# Patient Record
Sex: MALE | Race: ASIAN | NOT HISPANIC OR LATINO
[De-identification: names, ages, dates, MRNs, and addresses within clinical notes are randomized per-mention and may not be internally consistent; named-entity substitution may affect disease eponyms.]

---

## 2017-11-06 PROBLEM — Z00.00 ENCOUNTER FOR PREVENTIVE HEALTH EXAMINATION: Status: ACTIVE | Noted: 2017-11-06

## 2017-11-10 ENCOUNTER — NON-APPOINTMENT (OUTPATIENT)
Age: 77
End: 2017-11-10

## 2017-11-10 ENCOUNTER — APPOINTMENT (OUTPATIENT)
Dept: CARDIOLOGY | Facility: CLINIC | Age: 77
End: 2017-11-10
Payer: MEDICAID

## 2017-11-10 VITALS
SYSTOLIC BLOOD PRESSURE: 134 MMHG | WEIGHT: 155 LBS | HEIGHT: 70 IN | HEART RATE: 66 BPM | BODY MASS INDEX: 22.19 KG/M2 | OXYGEN SATURATION: 96 % | DIASTOLIC BLOOD PRESSURE: 75 MMHG

## 2017-11-10 DIAGNOSIS — Z78.9 OTHER SPECIFIED HEALTH STATUS: ICD-10-CM

## 2017-11-10 PROCEDURE — 93000 ELECTROCARDIOGRAM COMPLETE: CPT

## 2017-11-10 PROCEDURE — 99205 OFFICE O/P NEW HI 60 MIN: CPT

## 2017-12-11 DIAGNOSIS — E11.9 TYPE 2 DIABETES MELLITUS W/OUT COMPLICATIONS: ICD-10-CM

## 2018-01-03 ENCOUNTER — APPOINTMENT (OUTPATIENT)
Dept: CARDIOLOGY | Facility: CLINIC | Age: 78
End: 2018-01-03
Payer: MEDICAID

## 2018-01-03 PROCEDURE — 93306 TTE W/DOPPLER COMPLETE: CPT

## 2018-01-05 ENCOUNTER — APPOINTMENT (OUTPATIENT)
Dept: CARDIOLOGY | Facility: CLINIC | Age: 78
End: 2018-01-05

## 2018-01-26 ENCOUNTER — APPOINTMENT (OUTPATIENT)
Dept: CARDIOLOGY | Facility: CLINIC | Age: 78
End: 2018-01-26
Payer: MEDICAID

## 2018-01-26 PROCEDURE — 93015 CV STRESS TEST SUPVJ I&R: CPT

## 2018-01-26 PROCEDURE — A9500: CPT

## 2018-01-26 PROCEDURE — 78452 HT MUSCLE IMAGE SPECT MULT: CPT

## 2018-02-13 DIAGNOSIS — R94.39 ABNORMAL RESULT OF OTHER CARDIOVASCULAR FUNCTION STUDY: ICD-10-CM

## 2018-03-13 LAB
ANION GAP SERPL CALC-SCNC: 11 MMOL/L
BUN SERPL-MCNC: 20 MG/DL
CALCIUM SERPL-MCNC: 9.5 MG/DL
CHLORIDE SERPL-SCNC: 101 MMOL/L
CO2 SERPL-SCNC: 27 MMOL/L
CREAT SERPL-MCNC: 0.96 MG/DL
GLUCOSE SERPL-MCNC: 155 MG/DL
POTASSIUM SERPL-SCNC: 5 MMOL/L
SODIUM SERPL-SCNC: 139 MMOL/L

## 2018-03-15 ENCOUNTER — FORM ENCOUNTER (OUTPATIENT)
Age: 78
End: 2018-03-15

## 2018-03-16 ENCOUNTER — APPOINTMENT (OUTPATIENT)
Dept: CARDIOLOGY | Facility: CLINIC | Age: 78
End: 2018-03-16

## 2018-03-16 ENCOUNTER — OUTPATIENT (OUTPATIENT)
Dept: OUTPATIENT SERVICES | Facility: HOSPITAL | Age: 78
LOS: 1 days | End: 2018-03-16
Payer: MEDICAID

## 2018-03-16 DIAGNOSIS — R94.39 ABNORMAL RESULT OF OTHER CARDIOVASCULAR FUNCTION STUDY: ICD-10-CM

## 2018-03-16 PROCEDURE — 75574 CT ANGIO HRT W/3D IMAGE: CPT | Mod: 26,76

## 2018-03-16 PROCEDURE — 75574 CT ANGIO HRT W/3D IMAGE: CPT

## 2018-03-20 ENCOUNTER — CHART COPY (OUTPATIENT)
Age: 78
End: 2018-03-20

## 2018-03-30 ENCOUNTER — OUTPATIENT (OUTPATIENT)
Dept: OUTPATIENT SERVICES | Facility: HOSPITAL | Age: 78
LOS: 1 days | End: 2018-03-30
Payer: MEDICAID

## 2018-03-30 ENCOUNTER — APPOINTMENT (OUTPATIENT)
Dept: CARDIOTHORACIC SURGERY | Facility: CLINIC | Age: 78
End: 2018-03-30
Payer: MEDICAID

## 2018-03-30 VITALS
SYSTOLIC BLOOD PRESSURE: 165 MMHG | WEIGHT: 145.06 LBS | OXYGEN SATURATION: 99 % | HEART RATE: 59 BPM | RESPIRATION RATE: 16 BRPM | DIASTOLIC BLOOD PRESSURE: 75 MMHG | TEMPERATURE: 98 F | HEIGHT: 68 IN

## 2018-03-30 DIAGNOSIS — R93.1 ABNORMAL FINDINGS ON DIAGNOSTIC IMAGING OF HEART AND CORONARY CIRCULATION: ICD-10-CM

## 2018-03-30 LAB
ALBUMIN SERPL ELPH-MCNC: 4.5 G/DL — SIGNIFICANT CHANGE UP (ref 3.3–5)
ALP SERPL-CCNC: 37 U/L — LOW (ref 40–120)
ALT FLD-CCNC: 18 U/L RC — SIGNIFICANT CHANGE UP (ref 10–45)
ANION GAP SERPL CALC-SCNC: 7 MMOL/L — SIGNIFICANT CHANGE UP (ref 5–17)
AST SERPL-CCNC: 22 U/L — SIGNIFICANT CHANGE UP (ref 10–40)
BILIRUB SERPL-MCNC: 0.8 MG/DL — SIGNIFICANT CHANGE UP (ref 0.2–1.2)
BUN SERPL-MCNC: 21 MG/DL — SIGNIFICANT CHANGE UP (ref 7–23)
CALCIUM SERPL-MCNC: 9.8 MG/DL — SIGNIFICANT CHANGE UP (ref 8.4–10.5)
CHLORIDE SERPL-SCNC: 102 MMOL/L — SIGNIFICANT CHANGE UP (ref 96–108)
CO2 SERPL-SCNC: 29 MMOL/L — SIGNIFICANT CHANGE UP (ref 22–31)
CREAT SERPL-MCNC: 1.14 MG/DL — SIGNIFICANT CHANGE UP (ref 0.5–1.3)
GLUCOSE SERPL-MCNC: 156 MG/DL — HIGH (ref 70–99)
HCT VFR BLD CALC: 39.9 % — SIGNIFICANT CHANGE UP (ref 39–50)
HGB BLD-MCNC: 14 G/DL — SIGNIFICANT CHANGE UP (ref 13–17)
MCHC RBC-ENTMCNC: 33.5 PG — SIGNIFICANT CHANGE UP (ref 27–34)
MCHC RBC-ENTMCNC: 35 GM/DL — SIGNIFICANT CHANGE UP (ref 32–36)
MCV RBC AUTO: 95.7 FL — SIGNIFICANT CHANGE UP (ref 80–100)
PLATELET # BLD AUTO: 116 K/UL — LOW (ref 150–400)
POTASSIUM SERPL-MCNC: 4.8 MMOL/L — SIGNIFICANT CHANGE UP (ref 3.5–5.3)
POTASSIUM SERPL-SCNC: 4.8 MMOL/L — SIGNIFICANT CHANGE UP (ref 3.5–5.3)
PROT SERPL-MCNC: 8 G/DL — SIGNIFICANT CHANGE UP (ref 6–8.3)
RBC # BLD: 4.17 M/UL — LOW (ref 4.2–5.8)
RBC # FLD: 11.3 % — SIGNIFICANT CHANGE UP (ref 10.3–14.5)
SODIUM SERPL-SCNC: 138 MMOL/L — SIGNIFICANT CHANGE UP (ref 135–145)
WBC # BLD: 5.6 K/UL — SIGNIFICANT CHANGE UP (ref 3.8–10.5)
WBC # FLD AUTO: 5.6 K/UL — SIGNIFICANT CHANGE UP (ref 3.8–10.5)

## 2018-03-30 PROCEDURE — 93010 ELECTROCARDIOGRAM REPORT: CPT

## 2018-03-30 PROCEDURE — 94010 BREATHING CAPACITY TEST: CPT

## 2018-03-30 RX ORDER — SODIUM CHLORIDE 9 MG/ML
1000 INJECTION, SOLUTION INTRAVENOUS
Qty: 0 | Refills: 0 | Status: DISCONTINUED | OUTPATIENT
Start: 2018-03-30 | End: 2018-03-30

## 2018-03-30 RX ORDER — INSULIN LISPRO 100/ML
VIAL (ML) SUBCUTANEOUS
Qty: 0 | Refills: 0 | Status: DISCONTINUED | OUTPATIENT
Start: 2018-03-30 | End: 2018-03-30

## 2018-03-30 RX ORDER — GLUCAGON INJECTION, SOLUTION 0.5 MG/.1ML
1 INJECTION, SOLUTION SUBCUTANEOUS ONCE
Qty: 0 | Refills: 0 | Status: DISCONTINUED | OUTPATIENT
Start: 2018-03-30 | End: 2018-03-30

## 2018-03-30 RX ORDER — INSULIN LISPRO 100/ML
VIAL (ML) SUBCUTANEOUS AT BEDTIME
Qty: 0 | Refills: 0 | Status: DISCONTINUED | OUTPATIENT
Start: 2018-03-30 | End: 2018-03-30

## 2018-03-30 RX ORDER — SIMVASTATIN 20 MG/1
20 TABLET, FILM COATED ORAL AT BEDTIME
Qty: 0 | Refills: 0 | Status: DISCONTINUED | OUTPATIENT
Start: 2018-03-30 | End: 2018-03-30

## 2018-03-30 RX ORDER — DEXTROSE 50 % IN WATER 50 %
25 SYRINGE (ML) INTRAVENOUS ONCE
Qty: 0 | Refills: 0 | Status: DISCONTINUED | OUTPATIENT
Start: 2018-03-30 | End: 2018-03-30

## 2018-03-30 RX ORDER — DEXTROSE 50 % IN WATER 50 %
1 SYRINGE (ML) INTRAVENOUS ONCE
Qty: 0 | Refills: 0 | Status: DISCONTINUED | OUTPATIENT
Start: 2018-03-30 | End: 2018-03-30

## 2018-03-30 RX ORDER — ASPIRIN/CALCIUM CARB/MAGNESIUM 324 MG
81 TABLET ORAL DAILY
Qty: 0 | Refills: 0 | Status: DISCONTINUED | OUTPATIENT
Start: 2018-03-30 | End: 2018-03-30

## 2018-03-30 RX ORDER — DEXTROSE 50 % IN WATER 50 %
12.5 SYRINGE (ML) INTRAVENOUS ONCE
Qty: 0 | Refills: 0 | Status: DISCONTINUED | OUTPATIENT
Start: 2018-03-30 | End: 2018-03-30

## 2018-03-30 NOTE — H&P CARDIOLOGY - HISTORY OF PRESENT ILLNESS
This is a 77 yo Chinese male, speaks Mandarin only ( data obtained from Md note, daughter and  # ) with PMH of HLD and DM type 2 ( well managed, w/o complications, last A1C unknown, dx 20 years ago), denies any significant CV PMH or FH.  Presents to cardiologist, dr MARLA Alfred, w/o c/c for routine visit.  He has abnormal CT coronaries showing calcium score of 1078 and Significant stenosis of the distal right coronary artery (greater  than 70%)- see report below.       < from: CT Heart with Coronaries (03.16.18 @ 15:47) >    IMPRESSION:    1.  Coronary artery disease.  2.  The calculated Agatston score is 1078.  3.  Significant stenosis of the distal right coronary artery (greater   than 70%).  4.  Significant stenosis (greater than 70%) within the distal left   anterior descending coronary artery.    5.  Significant stenosis (greater than 70%) of the mid to distal   circumflex coronary artery and the third obtuse marginal division.    < end of copied text > This is a 79 yo Chinese male, speaks Mandarin only ( data obtained from Md note, daughter and  # ) with PMH of HLD and DM type 2 ( well managed, w/o complications, last A1C unknown, dx 20 years ago), denies any significant CV PMH or FH.  Presents to cardiologist, dr MARLA Alfred, w/o c/c for routine visit.  He has abnormal CT coronaries showing calcium score of 1078 and Significant stenosis of the distal right coronary artery (greater  than 70%)- see report below.  NST on 1/26 showed: medium sized, mod to severe defect in inferior distal, anterior, and apical walls that are mostly fixed, and only partially correct with prone imaging.  EF 52%. Findings most consisted with minimal paulina-infarct ischemia. Referred here today for cardiac cath w/ possible intervention.  Presently asymptomatic, denies chest pain, dyspnea, dizziness, palpitations, orthopnea, N&V, HA, abd pain, fever/chills.        < from: CT Heart with Coronaries (03.16.18 @ 15:47) >    IMPRESSION:    1.  Coronary artery disease.  2.  The calculated Agatston score is 1078.  3.  Significant stenosis of the distal right coronary artery (greater   than 70%).  4.  Significant stenosis (greater than 70%) within the distal left   anterior descending coronary artery.    5.  Significant stenosis (greater than 70%) of the mid to distal   circumflex coronary artery and the third obtuse marginal division.    < end of copied text > This is a 77 yo Chinese male, speaks Mandarin only ( data obtained from Md note, daughter and Mandarin  # [id number]) with PMH of HLD and DM type 2 ( well managed, w/o complications, last A1C unknown, dx 20 years ago), denies any significant CV PMH or FH.  Presents to cardiologist, dr MARLA Alfred, w/o any c/c for routine visit.  He had abnormal CT coronaries showing calcium score of 1078 and Significant stenosis of the distal right coronary artery (greater  than 70%)- see report below.  NST on 1/26 showed: medium sized, mod to severe defect in inferior distal, anterior, and apical walls that are mostly fixed, and only partially correct with prone imaging.  EF 52%. Findings most consisted with minimal paulina-infarct ischemia. Referred here today for cardiac cath w/ possible intervention.  Presently asymptomatic, denies chest pain, dyspnea, dizziness, palpitations, orthopnea, N&V, HA, abd pain, fever/chills.        < from: CT Heart with Coronaries (03.16.18 @ 15:47) >    IMPRESSION:    1.  Coronary artery disease.  2.  The calculated Agatston score is 1078.  3.  Significant stenosis of the distal right coronary artery (greater   than 70%).  4.  Significant stenosis (greater than 70%) within the distal left   anterior descending coronary artery.    5.  Significant stenosis (greater than 70%) of the mid to distal   circumflex coronary artery and the third obtuse marginal division.    < end of copied text >

## 2018-04-02 ENCOUNTER — OUTPATIENT (OUTPATIENT)
Dept: OUTPATIENT SERVICES | Facility: HOSPITAL | Age: 78
LOS: 1 days | End: 2018-04-02

## 2018-04-02 VITALS
TEMPERATURE: 98 F | SYSTOLIC BLOOD PRESSURE: 149 MMHG | RESPIRATION RATE: 18 BRPM | DIASTOLIC BLOOD PRESSURE: 77 MMHG | HEIGHT: 66.5 IN | OXYGEN SATURATION: 98 % | HEART RATE: 57 BPM | WEIGHT: 153 LBS

## 2018-04-02 DIAGNOSIS — I25.10 ATHEROSCLEROTIC HEART DISEASE OF NATIVE CORONARY ARTERY WITHOUT ANGINA PECTORIS: ICD-10-CM

## 2018-04-02 DIAGNOSIS — Z01.818 ENCOUNTER FOR OTHER PREPROCEDURAL EXAMINATION: ICD-10-CM

## 2018-04-02 DIAGNOSIS — Z96.7 PRESENCE OF OTHER BONE AND TENDON IMPLANTS: Chronic | ICD-10-CM

## 2018-04-02 DIAGNOSIS — E11.9 TYPE 2 DIABETES MELLITUS WITHOUT COMPLICATIONS: ICD-10-CM

## 2018-04-02 DIAGNOSIS — Z98.890 OTHER SPECIFIED POSTPROCEDURAL STATES: Chronic | ICD-10-CM

## 2018-04-02 LAB
BLD GP AB SCN SERPL QL: NEGATIVE — SIGNIFICANT CHANGE UP
HBA1C BLD-MCNC: 7.5 % — HIGH (ref 4–5.6)
HCT VFR BLD CALC: 37.2 % — LOW (ref 39–50)
HGB BLD-MCNC: 13.1 G/DL — SIGNIFICANT CHANGE UP (ref 13–17)
MCHC RBC-ENTMCNC: 32.6 PG — SIGNIFICANT CHANGE UP (ref 27–34)
MCHC RBC-ENTMCNC: 35.2 GM/DL — SIGNIFICANT CHANGE UP (ref 32–36)
MCV RBC AUTO: 92.5 FL — SIGNIFICANT CHANGE UP (ref 80–100)
MRSA PCR RESULT.: SIGNIFICANT CHANGE UP
NRBC # BLD: 0 /100 WBCS — SIGNIFICANT CHANGE UP (ref 0–0)
PLATELET # BLD AUTO: 125 K/UL — LOW (ref 150–400)
RBC # BLD: 4.02 M/UL — LOW (ref 4.2–5.8)
RBC # FLD: 12.4 % — SIGNIFICANT CHANGE UP (ref 10.3–14.5)
RH IG SCN BLD-IMP: POSITIVE — SIGNIFICANT CHANGE UP
S AUREUS DNA NOSE QL NAA+PROBE: SIGNIFICANT CHANGE UP
WBC # BLD: 6.5 K/UL — SIGNIFICANT CHANGE UP (ref 3.8–10.5)
WBC # FLD AUTO: 6.5 K/UL — SIGNIFICANT CHANGE UP (ref 3.8–10.5)

## 2018-04-02 NOTE — H&P PST ADULT - HISTORY OF PRESENT ILLNESS
79 y/o M PMH CAD, had an abnormal echo, stress test was abnormal as per daughter, found to have TVD on cardiac catheterization 77 y/o mandarin speaking M PMH CAD, some dyspnea on exertion noted, denies angina, had an abnormal echo, stress test as per daughter, found to have TVD on cardiac catheterization 3/2018.  Presents today for CABG X 3.

## 2018-04-02 NOTE — H&P PST ADULT - NSANTHOSAYNRD_GEN_A_CORE
No. VANE screening performed.  STOP BANG Legend: 0-2 = LOW Risk; 3-4 = INTERMEDIATE Risk; 5-8 = HIGH Risk

## 2018-04-02 NOTE — H&P PST ADULT - MEDICATION ADMINISTRATION INFO, PROFILE
translation services while in hospital, daughter will not be able to come in a few days after surgery

## 2018-04-04 ENCOUNTER — TRANSCRIPTION ENCOUNTER (OUTPATIENT)
Age: 78
End: 2018-04-04

## 2018-04-05 ENCOUNTER — INPATIENT (INPATIENT)
Facility: HOSPITAL | Age: 78
LOS: 4 days | Discharge: HOME | DRG: 236 | End: 2018-04-10
Payer: MEDICAID

## 2018-04-05 ENCOUNTER — APPOINTMENT (OUTPATIENT)
Dept: CARDIOTHORACIC SURGERY | Facility: HOSPITAL | Age: 78
End: 2018-04-05
Payer: MEDICAID

## 2018-04-05 VITALS
DIASTOLIC BLOOD PRESSURE: 75 MMHG | RESPIRATION RATE: 18 BRPM | SYSTOLIC BLOOD PRESSURE: 170 MMHG | HEART RATE: 60 BPM | OXYGEN SATURATION: 100 % | HEIGHT: 68.11 IN | WEIGHT: 151.24 LBS | TEMPERATURE: 98 F

## 2018-04-05 DIAGNOSIS — Z01.818 ENCOUNTER FOR OTHER PREPROCEDURAL EXAMINATION: ICD-10-CM

## 2018-04-05 DIAGNOSIS — Z96.7 PRESENCE OF OTHER BONE AND TENDON IMPLANTS: Chronic | ICD-10-CM

## 2018-04-05 DIAGNOSIS — Z98.890 OTHER SPECIFIED POSTPROCEDURAL STATES: Chronic | ICD-10-CM

## 2018-04-05 DIAGNOSIS — I25.10 ATHEROSCLEROTIC HEART DISEASE OF NATIVE CORONARY ARTERY WITHOUT ANGINA PECTORIS: ICD-10-CM

## 2018-04-05 LAB
ANION GAP SERPL CALC-SCNC: 14 MMOL/L — SIGNIFICANT CHANGE UP (ref 5–17)
APTT BLD: 40.8 SEC — HIGH (ref 27.5–37.4)
BASOPHILS # BLD AUTO: 0 K/UL — SIGNIFICANT CHANGE UP (ref 0–0.2)
BASOPHILS NFR BLD AUTO: 0.3 % — SIGNIFICANT CHANGE UP (ref 0–2)
BUN SERPL-MCNC: 12 MG/DL — SIGNIFICANT CHANGE UP (ref 7–23)
CALCIUM SERPL-MCNC: 10.2 MG/DL — SIGNIFICANT CHANGE UP (ref 8.4–10.5)
CHLORIDE SERPL-SCNC: 106 MMOL/L — SIGNIFICANT CHANGE UP (ref 96–108)
CK MB BLD-MCNC: 8.1 % — HIGH (ref 0–3.5)
CK MB CFR SERPL CALC: 24.1 NG/ML — HIGH (ref 0–6.7)
CK SERPL-CCNC: 297 U/L — HIGH (ref 30–200)
CO2 SERPL-SCNC: 23 MMOL/L — SIGNIFICANT CHANGE UP (ref 22–31)
CREAT SERPL-MCNC: 0.84 MG/DL — SIGNIFICANT CHANGE UP (ref 0.5–1.3)
EOSINOPHIL # BLD AUTO: 0 K/UL — SIGNIFICANT CHANGE UP (ref 0–0.5)
EOSINOPHIL NFR BLD AUTO: 0.4 % — SIGNIFICANT CHANGE UP (ref 0–6)
GAS PNL BLDA: SIGNIFICANT CHANGE UP
GLUCOSE BLDC GLUCOMTR-MCNC: 180 MG/DL — HIGH (ref 70–99)
GLUCOSE BLDC GLUCOMTR-MCNC: 232 MG/DL — HIGH (ref 70–99)
GLUCOSE SERPL-MCNC: 167 MG/DL — HIGH (ref 70–99)
HCT VFR BLD CALC: 26 % — LOW (ref 39–50)
HGB BLD-MCNC: 9.2 G/DL — LOW (ref 13–17)
INR BLD: 1.41 RATIO — HIGH (ref 0.88–1.16)
LYMPHOCYTES # BLD AUTO: 1.8 K/UL — SIGNIFICANT CHANGE UP (ref 1–3.3)
LYMPHOCYTES # BLD AUTO: 20.8 % — SIGNIFICANT CHANGE UP (ref 13–44)
MCHC RBC-ENTMCNC: 33.3 PG — SIGNIFICANT CHANGE UP (ref 27–34)
MCHC RBC-ENTMCNC: 35.2 GM/DL — SIGNIFICANT CHANGE UP (ref 32–36)
MCV RBC AUTO: 94.5 FL — SIGNIFICANT CHANGE UP (ref 80–100)
MONOCYTES # BLD AUTO: 0.5 K/UL — SIGNIFICANT CHANGE UP (ref 0–0.9)
MONOCYTES NFR BLD AUTO: 5.9 % — SIGNIFICANT CHANGE UP (ref 2–14)
NEUTROPHILS # BLD AUTO: 6.2 K/UL — SIGNIFICANT CHANGE UP (ref 1.8–7.4)
NEUTROPHILS NFR BLD AUTO: 72.6 % — SIGNIFICANT CHANGE UP (ref 43–77)
PLATELET # BLD AUTO: 94 K/UL — LOW (ref 150–400)
POTASSIUM SERPL-MCNC: 3.9 MMOL/L — SIGNIFICANT CHANGE UP (ref 3.5–5.3)
POTASSIUM SERPL-SCNC: 3.9 MMOL/L — SIGNIFICANT CHANGE UP (ref 3.5–5.3)
PROTHROM AB SERPL-ACNC: 15.3 SEC — HIGH (ref 9.8–12.7)
RBC # BLD: 2.75 M/UL — LOW (ref 4.2–5.8)
RBC # FLD: 11.4 % — SIGNIFICANT CHANGE UP (ref 10.3–14.5)
RH IG SCN BLD-IMP: POSITIVE — SIGNIFICANT CHANGE UP
SODIUM SERPL-SCNC: 143 MMOL/L — SIGNIFICANT CHANGE UP (ref 135–145)
TROPONIN T SERPL-MCNC: 0.59 NG/ML — HIGH (ref 0–0.06)
WBC # BLD: 8.6 K/UL — SIGNIFICANT CHANGE UP (ref 3.8–10.5)
WBC # FLD AUTO: 8.6 K/UL — SIGNIFICANT CHANGE UP (ref 3.8–10.5)

## 2018-04-05 PROCEDURE — 99152 MOD SED SAME PHYS/QHP 5/>YRS: CPT

## 2018-04-05 PROCEDURE — 71045 X-RAY EXAM CHEST 1 VIEW: CPT | Mod: 26

## 2018-04-05 PROCEDURE — 71045 X-RAY EXAM CHEST 1 VIEW: CPT

## 2018-04-05 PROCEDURE — 93005 ELECTROCARDIOGRAM TRACING: CPT

## 2018-04-05 PROCEDURE — 33518 CABG ARTERY-VEIN TWO: CPT | Mod: AS

## 2018-04-05 PROCEDURE — 93458 L HRT ARTERY/VENTRICLE ANGIO: CPT

## 2018-04-05 PROCEDURE — C1894: CPT

## 2018-04-05 PROCEDURE — 33508 ENDOSCOPIC VEIN HARVEST: CPT | Mod: AS

## 2018-04-05 PROCEDURE — 80053 COMPREHEN METABOLIC PANEL: CPT

## 2018-04-05 PROCEDURE — 33533 CABG ARTERIAL SINGLE: CPT

## 2018-04-05 PROCEDURE — 93880 EXTRACRANIAL BILAT STUDY: CPT

## 2018-04-05 PROCEDURE — 33533 CABG ARTERIAL SINGLE: CPT | Mod: AS

## 2018-04-05 PROCEDURE — 33508 ENDOSCOPIC VEIN HARVEST: CPT

## 2018-04-05 PROCEDURE — 33518 CABG ARTERY-VEIN TWO: CPT

## 2018-04-05 PROCEDURE — C1887: CPT

## 2018-04-05 PROCEDURE — 85027 COMPLETE CBC AUTOMATED: CPT

## 2018-04-05 PROCEDURE — 93452 LEFT HRT CATH W/VENTRCLGRPHY: CPT

## 2018-04-05 PROCEDURE — C1769: CPT

## 2018-04-05 PROCEDURE — 93010 ELECTROCARDIOGRAM REPORT: CPT

## 2018-04-05 RX ORDER — POTASSIUM CHLORIDE 20 MEQ
10 PACKET (EA) ORAL ONCE
Qty: 0 | Refills: 0 | Status: DISCONTINUED | OUTPATIENT
Start: 2018-04-05 | End: 2018-04-07

## 2018-04-05 RX ORDER — CEFUROXIME AXETIL 250 MG
1500 TABLET ORAL ONCE
Qty: 0 | Refills: 0 | Status: COMPLETED | OUTPATIENT
Start: 2018-04-05 | End: 2018-04-05

## 2018-04-05 RX ORDER — METOCLOPRAMIDE HCL 10 MG
10 TABLET ORAL EVERY 8 HOURS
Qty: 0 | Refills: 0 | Status: COMPLETED | OUTPATIENT
Start: 2018-04-05 | End: 2018-04-07

## 2018-04-05 RX ORDER — SODIUM CHLORIDE 9 MG/ML
3 INJECTION INTRAMUSCULAR; INTRAVENOUS; SUBCUTANEOUS EVERY 8 HOURS
Qty: 0 | Refills: 0 | Status: DISCONTINUED | OUTPATIENT
Start: 2018-04-05 | End: 2018-04-05

## 2018-04-05 RX ORDER — DEXMEDETOMIDINE HYDROCHLORIDE IN 0.9% SODIUM CHLORIDE 4 UG/ML
0.2 INJECTION INTRAVENOUS
Qty: 200 | Refills: 0 | Status: DISCONTINUED | OUTPATIENT
Start: 2018-04-05 | End: 2018-04-06

## 2018-04-05 RX ORDER — MAGNESIUM SULFATE 500 MG/ML
2 VIAL (ML) INJECTION ONCE
Qty: 0 | Refills: 0 | Status: COMPLETED | OUTPATIENT
Start: 2018-04-05 | End: 2018-04-05

## 2018-04-05 RX ORDER — POTASSIUM CHLORIDE 20 MEQ
10 PACKET (EA) ORAL
Qty: 0 | Refills: 0 | Status: DISCONTINUED | OUTPATIENT
Start: 2018-04-05 | End: 2018-04-07

## 2018-04-05 RX ORDER — SODIUM CHLORIDE 9 MG/ML
1000 INJECTION, SOLUTION INTRAVENOUS
Qty: 0 | Refills: 0 | Status: DISCONTINUED | OUTPATIENT
Start: 2018-04-05 | End: 2018-04-10

## 2018-04-05 RX ORDER — ASPIRIN/CALCIUM CARB/MAGNESIUM 324 MG
1 TABLET ORAL
Qty: 0 | Refills: 0 | COMMUNITY

## 2018-04-05 RX ORDER — DOCUSATE SODIUM 100 MG
100 CAPSULE ORAL THREE TIMES A DAY
Qty: 0 | Refills: 0 | Status: DISCONTINUED | OUTPATIENT
Start: 2018-04-05 | End: 2018-04-10

## 2018-04-05 RX ORDER — POTASSIUM CHLORIDE 20 MEQ
10 PACKET (EA) ORAL
Qty: 0 | Refills: 0 | Status: COMPLETED | OUTPATIENT
Start: 2018-04-05 | End: 2018-04-06

## 2018-04-05 RX ORDER — NOREPINEPHRINE BITARTRATE/D5W 8 MG/250ML
0.02 PLASTIC BAG, INJECTION (ML) INTRAVENOUS
Qty: 8 | Refills: 0 | Status: DISCONTINUED | OUTPATIENT
Start: 2018-04-05 | End: 2018-04-07

## 2018-04-05 RX ORDER — SIMVASTATIN 20 MG/1
1 TABLET, FILM COATED ORAL
Qty: 0 | Refills: 0 | COMMUNITY

## 2018-04-05 RX ORDER — LIDOCAINE HCL 20 MG/ML
0.2 VIAL (ML) INJECTION ONCE
Qty: 0 | Refills: 0 | Status: DISCONTINUED | OUTPATIENT
Start: 2018-04-05 | End: 2018-04-05

## 2018-04-05 RX ORDER — CEFUROXIME AXETIL 250 MG
1500 TABLET ORAL EVERY 8 HOURS
Qty: 0 | Refills: 0 | Status: COMPLETED | OUTPATIENT
Start: 2018-04-05 | End: 2018-04-07

## 2018-04-05 RX ORDER — FAMOTIDINE 10 MG/ML
20 INJECTION INTRAVENOUS EVERY 12 HOURS
Qty: 0 | Refills: 0 | Status: DISCONTINUED | OUTPATIENT
Start: 2018-04-05 | End: 2018-04-06

## 2018-04-05 RX ORDER — INSULIN HUMAN 100 [IU]/ML
3 INJECTION, SOLUTION SUBCUTANEOUS
Qty: 100 | Refills: 0 | Status: DISCONTINUED | OUTPATIENT
Start: 2018-04-05 | End: 2018-04-06

## 2018-04-05 RX ORDER — POTASSIUM CHLORIDE 20 MEQ
10 PACKET (EA) ORAL
Qty: 0 | Refills: 0 | Status: COMPLETED | OUTPATIENT
Start: 2018-04-05 | End: 2018-04-05

## 2018-04-05 RX ORDER — MEPERIDINE HYDROCHLORIDE 50 MG/ML
25 INJECTION INTRAMUSCULAR; INTRAVENOUS; SUBCUTANEOUS ONCE
Qty: 0 | Refills: 0 | Status: DISCONTINUED | OUTPATIENT
Start: 2018-04-05 | End: 2018-04-06

## 2018-04-05 RX ORDER — ALBUMIN HUMAN 25 %
250 VIAL (ML) INTRAVENOUS
Qty: 0 | Refills: 0 | Status: COMPLETED | OUTPATIENT
Start: 2018-04-05 | End: 2018-04-05

## 2018-04-05 RX ADMIN — SODIUM CHLORIDE 3 MILLILITER(S): 9 INJECTION INTRAMUSCULAR; INTRAVENOUS; SUBCUTANEOUS at 12:00

## 2018-04-05 RX ADMIN — Medication 100 MILLIEQUIVALENT(S): at 20:34

## 2018-04-05 RX ADMIN — Medication 10 MILLIGRAM(S): at 22:38

## 2018-04-05 RX ADMIN — Medication 125 MILLILITER(S): at 23:00

## 2018-04-05 RX ADMIN — Medication 100 MILLIEQUIVALENT(S): at 23:39

## 2018-04-05 RX ADMIN — Medication 100 MILLIGRAM(S): at 12:00

## 2018-04-05 RX ADMIN — Medication 50 GRAM(S): at 22:00

## 2018-04-05 RX ADMIN — Medication 125 MILLILITER(S): at 22:00

## 2018-04-05 RX ADMIN — Medication 100 MILLIEQUIVALENT(S): at 21:00

## 2018-04-05 RX ADMIN — Medication 100 MILLIEQUIVALENT(S): at 19:30

## 2018-04-05 NOTE — BRIEF OPERATIVE NOTE - PROCEDURE
<<-----Click on this checkbox to enter Procedure CABG (coronary artery bypass graft)  04/05/2018  CABGx3 (LIMA-LAD, SVG-PAD, SVG-OM)  Active  KAMIUCA CABG (coronary artery bypass graft)  04/05/2018  CABGx3 (LIMA-LAD, SVG-Diag, SVG-OM)  Active  Sheila Batista

## 2018-04-06 LAB
ALBUMIN SERPL ELPH-MCNC: 4.5 G/DL — SIGNIFICANT CHANGE UP (ref 3.3–5)
ALP SERPL-CCNC: 19 U/L — LOW (ref 40–120)
ALT FLD-CCNC: 13 U/L RC — SIGNIFICANT CHANGE UP (ref 10–45)
ANION GAP SERPL CALC-SCNC: 15 MMOL/L — SIGNIFICANT CHANGE UP (ref 5–17)
APTT BLD: 38 SEC — HIGH (ref 27.5–37.4)
AST SERPL-CCNC: 28 U/L — SIGNIFICANT CHANGE UP (ref 10–40)
BASOPHILS # BLD AUTO: 0 K/UL — SIGNIFICANT CHANGE UP (ref 0–0.2)
BASOPHILS NFR BLD AUTO: 0 % — SIGNIFICANT CHANGE UP (ref 0–2)
BASOPHILS NFR BLD AUTO: 0.1 % — SIGNIFICANT CHANGE UP (ref 0–2)
BASOPHILS NFR BLD AUTO: 0.2 % — SIGNIFICANT CHANGE UP (ref 0–2)
BILIRUB SERPL-MCNC: 1.5 MG/DL — HIGH (ref 0.2–1.2)
BUN SERPL-MCNC: 13 MG/DL — SIGNIFICANT CHANGE UP (ref 7–23)
CALCIUM SERPL-MCNC: 9.1 MG/DL — SIGNIFICANT CHANGE UP (ref 8.4–10.5)
CHLORIDE SERPL-SCNC: 105 MMOL/L — SIGNIFICANT CHANGE UP (ref 96–108)
CK MB BLD-MCNC: 6.4 % — HIGH (ref 0–3.5)
CK MB CFR SERPL CALC: 18.1 NG/ML — HIGH (ref 0–6.7)
CK SERPL-CCNC: 282 U/L — HIGH (ref 30–200)
CO2 SERPL-SCNC: 21 MMOL/L — LOW (ref 22–31)
CREAT SERPL-MCNC: 0.96 MG/DL — SIGNIFICANT CHANGE UP (ref 0.5–1.3)
EOSINOPHIL # BLD AUTO: 0 K/UL — SIGNIFICANT CHANGE UP (ref 0–0.5)
EOSINOPHIL NFR BLD AUTO: 0.1 % — SIGNIFICANT CHANGE UP (ref 0–6)
EOSINOPHIL NFR BLD AUTO: 0.1 % — SIGNIFICANT CHANGE UP (ref 0–6)
EOSINOPHIL NFR BLD AUTO: 0.2 % — SIGNIFICANT CHANGE UP (ref 0–6)
GAS PNL BLDA: SIGNIFICANT CHANGE UP
GLUCOSE BLDC GLUCOMTR-MCNC: 101 MG/DL — HIGH (ref 70–99)
GLUCOSE BLDC GLUCOMTR-MCNC: 112 MG/DL — HIGH (ref 70–99)
GLUCOSE BLDC GLUCOMTR-MCNC: 115 MG/DL — HIGH (ref 70–99)
GLUCOSE BLDC GLUCOMTR-MCNC: 134 MG/DL — HIGH (ref 70–99)
GLUCOSE BLDC GLUCOMTR-MCNC: 138 MG/DL — HIGH (ref 70–99)
GLUCOSE BLDC GLUCOMTR-MCNC: 146 MG/DL — HIGH (ref 70–99)
GLUCOSE BLDC GLUCOMTR-MCNC: 167 MG/DL — HIGH (ref 70–99)
GLUCOSE BLDC GLUCOMTR-MCNC: 192 MG/DL — HIGH (ref 70–99)
GLUCOSE BLDC GLUCOMTR-MCNC: 196 MG/DL — HIGH (ref 70–99)
GLUCOSE BLDC GLUCOMTR-MCNC: 246 MG/DL — HIGH (ref 70–99)
GLUCOSE BLDC GLUCOMTR-MCNC: 303 MG/DL — HIGH (ref 70–99)
GLUCOSE BLDC GLUCOMTR-MCNC: 317 MG/DL — HIGH (ref 70–99)
GLUCOSE BLDC GLUCOMTR-MCNC: 88 MG/DL — SIGNIFICANT CHANGE UP (ref 70–99)
GLUCOSE SERPL-MCNC: 181 MG/DL — HIGH (ref 70–99)
HCT VFR BLD CALC: 21.2 % — LOW (ref 39–50)
HCT VFR BLD CALC: 26.5 % — LOW (ref 39–50)
HCT VFR BLD CALC: 27.5 % — LOW (ref 39–50)
HGB BLD-MCNC: 7.7 G/DL — LOW (ref 13–17)
HGB BLD-MCNC: 9.6 G/DL — LOW (ref 13–17)
HGB BLD-MCNC: 9.8 G/DL — LOW (ref 13–17)
INR BLD: 1.32 RATIO — HIGH (ref 0.88–1.16)
LYMPHOCYTES # BLD AUTO: 0.3 K/UL — LOW (ref 1–3.3)
LYMPHOCYTES # BLD AUTO: 1 K/UL — SIGNIFICANT CHANGE UP (ref 1–3.3)
LYMPHOCYTES # BLD AUTO: 1 K/UL — SIGNIFICANT CHANGE UP (ref 1–3.3)
LYMPHOCYTES # BLD AUTO: 2.4 % — LOW (ref 13–44)
LYMPHOCYTES # BLD AUTO: 9 % — LOW (ref 13–44)
LYMPHOCYTES # BLD AUTO: 9.4 % — LOW (ref 13–44)
MCHC RBC-ENTMCNC: 33.4 PG — SIGNIFICANT CHANGE UP (ref 27–34)
MCHC RBC-ENTMCNC: 33.7 PG — SIGNIFICANT CHANGE UP (ref 27–34)
MCHC RBC-ENTMCNC: 34.5 PG — HIGH (ref 27–34)
MCHC RBC-ENTMCNC: 35.4 GM/DL — SIGNIFICANT CHANGE UP (ref 32–36)
MCHC RBC-ENTMCNC: 36.1 GM/DL — HIGH (ref 32–36)
MCHC RBC-ENTMCNC: 36.4 GM/DL — HIGH (ref 32–36)
MCV RBC AUTO: 93.5 FL — SIGNIFICANT CHANGE UP (ref 80–100)
MCV RBC AUTO: 94.1 FL — SIGNIFICANT CHANGE UP (ref 80–100)
MCV RBC AUTO: 94.7 FL — SIGNIFICANT CHANGE UP (ref 80–100)
MONOCYTES # BLD AUTO: 0.3 K/UL — SIGNIFICANT CHANGE UP (ref 0–0.9)
MONOCYTES # BLD AUTO: 0.4 K/UL — SIGNIFICANT CHANGE UP (ref 0–0.9)
MONOCYTES # BLD AUTO: 0.4 K/UL — SIGNIFICANT CHANGE UP (ref 0–0.9)
MONOCYTES NFR BLD AUTO: 3.2 % — SIGNIFICANT CHANGE UP (ref 2–14)
MONOCYTES NFR BLD AUTO: 3.8 % — SIGNIFICANT CHANGE UP (ref 2–14)
MONOCYTES NFR BLD AUTO: 4 % — SIGNIFICANT CHANGE UP (ref 2–14)
NEUTROPHILS # BLD AUTO: 10.3 K/UL — HIGH (ref 1.8–7.4)
NEUTROPHILS # BLD AUTO: 9 K/UL — HIGH (ref 1.8–7.4)
NEUTROPHILS # BLD AUTO: 9.7 K/UL — HIGH (ref 1.8–7.4)
NEUTROPHILS NFR BLD AUTO: 86.8 % — HIGH (ref 43–77)
NEUTROPHILS NFR BLD AUTO: 87.1 % — HIGH (ref 43–77)
NEUTROPHILS NFR BLD AUTO: 93.6 % — HIGH (ref 43–77)
PLATELET # BLD AUTO: 105 K/UL — LOW (ref 150–400)
PLATELET # BLD AUTO: 66 K/UL — LOW (ref 150–400)
PLATELET # BLD AUTO: 82 K/UL — LOW (ref 150–400)
POTASSIUM SERPL-MCNC: 4.5 MMOL/L — SIGNIFICANT CHANGE UP (ref 3.5–5.3)
POTASSIUM SERPL-SCNC: 4.5 MMOL/L — SIGNIFICANT CHANGE UP (ref 3.5–5.3)
PROT SERPL-MCNC: 5.8 G/DL — LOW (ref 6–8.3)
PROTHROM AB SERPL-ACNC: 14.3 SEC — HIGH (ref 9.8–12.7)
RBC # BLD: 2.24 M/UL — LOW (ref 4.2–5.8)
RBC # BLD: 2.84 M/UL — LOW (ref 4.2–5.8)
RBC # BLD: 2.93 M/UL — LOW (ref 4.2–5.8)
RBC # FLD: 11.2 % — SIGNIFICANT CHANGE UP (ref 10.3–14.5)
RBC # FLD: 11.9 % — SIGNIFICANT CHANGE UP (ref 10.3–14.5)
RBC # FLD: 12.4 % — SIGNIFICANT CHANGE UP (ref 10.3–14.5)
SODIUM SERPL-SCNC: 141 MMOL/L — SIGNIFICANT CHANGE UP (ref 135–145)
TROPONIN T SERPL-MCNC: 0.49 NG/ML — HIGH (ref 0–0.06)
WBC # BLD: 10.4 K/UL — SIGNIFICANT CHANGE UP (ref 3.8–10.5)
WBC # BLD: 11 K/UL — HIGH (ref 3.8–10.5)
WBC # BLD: 11.1 K/UL — HIGH (ref 3.8–10.5)
WBC # FLD AUTO: 10.4 K/UL — SIGNIFICANT CHANGE UP (ref 3.8–10.5)
WBC # FLD AUTO: 11 K/UL — HIGH (ref 3.8–10.5)
WBC # FLD AUTO: 11.1 K/UL — HIGH (ref 3.8–10.5)

## 2018-04-06 PROCEDURE — 33533 CABG ARTERIAL SINGLE: CPT | Mod: 79

## 2018-04-06 PROCEDURE — 71045 X-RAY EXAM CHEST 1 VIEW: CPT | Mod: 26

## 2018-04-06 PROCEDURE — 33518 CABG ARTERY-VEIN TWO: CPT

## 2018-04-06 PROCEDURE — 93010 ELECTROCARDIOGRAM REPORT: CPT

## 2018-04-06 PROCEDURE — 33508 ENDOSCOPIC VEIN HARVEST: CPT

## 2018-04-06 RX ORDER — POTASSIUM CHLORIDE 20 MEQ
10 PACKET (EA) ORAL
Qty: 0 | Refills: 0 | Status: COMPLETED | OUTPATIENT
Start: 2018-04-06 | End: 2018-04-06

## 2018-04-06 RX ORDER — HEPARIN SODIUM 5000 [USP'U]/ML
5000 INJECTION INTRAVENOUS; SUBCUTANEOUS EVERY 8 HOURS
Qty: 0 | Refills: 0 | Status: DISCONTINUED | OUTPATIENT
Start: 2018-04-06 | End: 2018-04-10

## 2018-04-06 RX ORDER — ASPIRIN/CALCIUM CARB/MAGNESIUM 324 MG
81 TABLET ORAL DAILY
Qty: 0 | Refills: 0 | Status: DISCONTINUED | OUTPATIENT
Start: 2018-04-06 | End: 2018-04-10

## 2018-04-06 RX ORDER — INSULIN HUMAN 100 [IU]/ML
6 INJECTION, SOLUTION SUBCUTANEOUS
Qty: 100 | Refills: 0 | Status: DISCONTINUED | OUTPATIENT
Start: 2018-04-06 | End: 2018-04-07

## 2018-04-06 RX ORDER — ALBUMIN HUMAN 25 %
250 VIAL (ML) INTRAVENOUS
Qty: 0 | Refills: 0 | Status: COMPLETED | OUTPATIENT
Start: 2018-04-06 | End: 2018-04-06

## 2018-04-06 RX ORDER — INSULIN GLARGINE 100 [IU]/ML
10 INJECTION, SOLUTION SUBCUTANEOUS AT BEDTIME
Qty: 0 | Refills: 0 | Status: DISCONTINUED | OUTPATIENT
Start: 2018-04-06 | End: 2018-04-07

## 2018-04-06 RX ORDER — METOPROLOL TARTRATE 50 MG
25 TABLET ORAL
Qty: 0 | Refills: 0 | Status: DISCONTINUED | OUTPATIENT
Start: 2018-04-06 | End: 2018-04-06

## 2018-04-06 RX ORDER — ALBUMIN HUMAN 25 %
250 VIAL (ML) INTRAVENOUS ONCE
Qty: 0 | Refills: 0 | Status: COMPLETED | OUTPATIENT
Start: 2018-04-06 | End: 2018-04-06

## 2018-04-06 RX ORDER — INSULIN LISPRO 100/ML
VIAL (ML) SUBCUTANEOUS
Qty: 0 | Refills: 0 | Status: DISCONTINUED | OUTPATIENT
Start: 2018-04-06 | End: 2018-04-10

## 2018-04-06 RX ORDER — FAMOTIDINE 10 MG/ML
20 INJECTION INTRAVENOUS
Qty: 0 | Refills: 0 | Status: DISCONTINUED | OUTPATIENT
Start: 2018-04-06 | End: 2018-04-10

## 2018-04-06 RX ADMIN — Medication 81 MILLIGRAM(S): at 12:31

## 2018-04-06 RX ADMIN — Medication 100 MILLIEQUIVALENT(S): at 00:53

## 2018-04-06 RX ADMIN — HEPARIN SODIUM 5000 UNIT(S): 5000 INJECTION INTRAVENOUS; SUBCUTANEOUS at 13:17

## 2018-04-06 RX ADMIN — Medication 125 MILLILITER(S): at 06:45

## 2018-04-06 RX ADMIN — Medication 100 MILLIGRAM(S): at 22:25

## 2018-04-06 RX ADMIN — Medication 100 MILLIGRAM(S): at 13:17

## 2018-04-06 RX ADMIN — Medication 10 MILLIGRAM(S): at 05:25

## 2018-04-06 RX ADMIN — Medication 25 MILLIGRAM(S): at 13:17

## 2018-04-06 RX ADMIN — Medication 100 MILLIEQUIVALENT(S): at 06:00

## 2018-04-06 RX ADMIN — HEPARIN SODIUM 5000 UNIT(S): 5000 INJECTION INTRAVENOUS; SUBCUTANEOUS at 22:25

## 2018-04-06 RX ADMIN — FAMOTIDINE 20 MILLIGRAM(S): 10 INJECTION INTRAVENOUS at 17:49

## 2018-04-06 RX ADMIN — Medication 10 MILLIGRAM(S): at 22:25

## 2018-04-06 RX ADMIN — Medication 125 MILLILITER(S): at 04:29

## 2018-04-06 RX ADMIN — Medication 10 MILLIGRAM(S): at 13:18

## 2018-04-06 RX ADMIN — Medication 250 MILLILITER(S): at 12:21

## 2018-04-06 RX ADMIN — Medication 125 MILLILITER(S): at 03:26

## 2018-04-06 RX ADMIN — FAMOTIDINE 20 MILLIGRAM(S): 10 INJECTION INTRAVENOUS at 05:58

## 2018-04-06 RX ADMIN — Medication 250 MILLILITER(S): at 15:28

## 2018-04-06 RX ADMIN — Medication 100 MILLIGRAM(S): at 05:00

## 2018-04-06 RX ADMIN — Medication 100 MILLIEQUIVALENT(S): at 05:25

## 2018-04-06 RX ADMIN — Medication 100 MILLIEQUIVALENT(S): at 07:00

## 2018-04-06 RX ADMIN — Medication 125 MILLILITER(S): at 06:27

## 2018-04-06 RX ADMIN — INSULIN GLARGINE 10 UNIT(S): 100 INJECTION, SOLUTION SUBCUTANEOUS at 22:24

## 2018-04-06 NOTE — AIRWAY REMOVAL NOTE  ADULT & PEDS - ARTIFICAL AIRWAY REMOVAL COMMENTS
Written order for extubation verified. The patient was identified by full name and birth date compared to the identification band. RN Present during the procedure.

## 2018-04-06 NOTE — PHYSICAL THERAPY INITIAL EVALUATION ADULT - PLANNED THERAPY INTERVENTIONS, PT EVAL
gait training/transfer training/bed mobility training/balance training/GOAL: Pt will negotiate 12 stairs with 1 HR and step to pattern with independence in 2 weeks.

## 2018-04-06 NOTE — PHYSICAL THERAPY INITIAL EVALUATION ADULT - DISCHARGE DISPOSITION, PT EVAL
home w/ home PT/Home with home PT for gait, balance, & strength training and to return pt to baseline functional mobility status. AD needs to be assessed. Assist with mobility skills at this time (pt's family states they can provide).

## 2018-04-06 NOTE — PHYSICAL THERAPY INITIAL EVALUATION ADULT - ADDITIONAL COMMENTS
Pt lives with spouse, daughter, and 2 grandchildren in private home with no stairs to enter, first floor setup within. Pt does not own any ADs.

## 2018-04-06 NOTE — PHYSICAL THERAPY INITIAL EVALUATION ADULT - BALANCE TRAINING, PT EVAL
GOAL: Pt will improve static/dynamic standing balance by 1/2 grade to improve level of independence with mobility skills and ADLs in 2 weeks.

## 2018-04-06 NOTE — PROGRESS NOTE ADULT - ASSESSMENT
78M mandarin speaking POD 1 s/p C3L, extubated, NAD, off drips, PRBC x 2 for anemia/lactemia.    - hemodynamic monitoring  - Strict I's/O's  - restart home meds as tolerated  - GI/DVT ppx  - FSG/ pain control  - OOB/ambulate as tolerates/ IS/pulm toilet  - d/w ICU team in AM

## 2018-04-06 NOTE — PHYSICAL THERAPY INITIAL EVALUATION ADULT - PERTINENT HX OF CURRENT PROBLEM, REHAB EVAL
79 y/o mandarin speaking M PMH CAD, some dyspnea on exertion noted, denies angina, had an abnormal echo, stress test as per daughter, found to have TVD on cardiac catheterization 3/2018.  Presents today for CABG X 3.

## 2018-04-06 NOTE — PHYSICAL THERAPY INITIAL EVALUATION ADULT - GENERAL OBSERVATIONS, REHAB EVAL
Pt received seated in chair, (+) chest tube, external pacer, pierre, 3L NCO2, L LE venodyne sleeve, R IJ, L radial A-line, NAD. Pt Mandarin-speaking, alert, agreeable to PT eval. Pt's spouse, daughter, and 2 grandchildren at side.

## 2018-04-06 NOTE — PROGRESS NOTE ADULT - SUBJECTIVE AND OBJECTIVE BOX
Operation: POD 1 s/p C3L  POD: 1  Narrative: Pt. seen and examined. Mandarin speaking    Vital Signs Last 24 Hrs  T(C): 37.1 (06 Apr 2018 07:00), Max: 37.5 (06 Apr 2018 03:00)  T(F): 98.8 (06 Apr 2018 07:00), Max: 99.5 (06 Apr 2018 03:00)  HR: 84 (06 Apr 2018 07:00) (60 - 88)  BP: 170/75 (05 Apr 2018 11:19) (170/75 - 170/75)  BP(mean): --  RR: 11 (06 Apr 2018 07:00) (9 - 22)  SpO2: 100% (06 Apr 2018 07:00) (100% - 100%)  I&O's Detail    05 Apr 2018 07:01  -  06 Apr 2018 07:00  --------------------------------------------------------  IN:    Albumin 5%  - 250 mL: 1500 mL    dexmedetomidine Infusion: 14 mL    insulin Infusion: 40 mL    IV PiggyBack: 450 mL    norepinephrine Infusion: 20 mL    Packed Red Blood Cells: 250 mL    sodium chloride 0.45%.: 130 mL  Total IN: 2404 mL    OUT:    Chest Tube: 240 mL    Chest Tube: 200 mL    Indwelling Catheter - Urethral: 2255 mL  Total OUT: 2695 mL    Total NET: -291 mL          LABS:                       9.8    10.4  )-----------( 82       ( 06 Apr 2018 03:10 )             27.5     04-06    141  |  105  |  13  ----------------------------<  181<H>  4.5   |  21<L>  |  0.96    Ca    9.1      06 Apr 2018 01:13    TPro  5.8<L>  /  Alb  4.5  /  TBili  1.5<H>  /  DBili  x   /  AST  28  /  ALT  13  /  AlkPhos  19<L>  04-06    PT/INR - ( 06 Apr 2018 01:13 )   PT: 14.3 sec;   INR: 1.32 ratio         PTT - ( 06 Apr 2018 01:13 )  PTT:38.0 sec  ABG - ( 06 Apr 2018 06:44 )  pH: 7.36  /  pCO2: 37    /  pO2: 195   / HCO3: 20    / Base Excess: -3.9  /  SaO2: 100               CARDIAC MARKERS ( 06 Apr 2018 01:13 )  x     / 0.49 ng/mL / 282 U/L / x     / 18.1 ng/mL  CARDIAC MARKERS ( 05 Apr 2018 19:51 )  x     / 0.59 ng/mL / 297 U/L / x     / 24.1 ng/mL        MEDICATIONS  (STANDING):  cefuroxime  IVPB 1500 milliGRAM(s) IV Intermittent every 8 hours  dexmedetomidine Infusion 0.2 MICROgram(s)/kG/Hr (3.43 mL/Hr) IV Continuous <Continuous>  docusate sodium 100 milliGRAM(s) Oral three times a day  famotidine Injectable 20 milliGRAM(s) IV Push every 12 hours  insulin Infusion 3 Unit(s)/Hr (3 mL/Hr) IV Continuous <Continuous>  metoclopramide Injectable 10 milliGRAM(s) IV Push every 8 hours  norepinephrine Infusion 0.016 MICROgram(s)/kG/Min (2 mL/Hr) IV Continuous <Continuous>  potassium chloride  10 mEq/50 mL IVPB 10 milliEquivalent(s) IV Intermittent every 1 hour  potassium chloride  10 mEq/50 mL IVPB 10 milliEquivalent(s) IV Intermittent every 1 hour  potassium chloride  10 mEq/50 mL IVPB 10 milliEquivalent(s) IV Intermittent once  sodium chloride 0.45%. 1000 milliLiter(s) (10 mL/Hr) IV Continuous <Continuous>    MEDICATIONS  (PRN):  meperidine     Injectable 25 milliGRAM(s) IV Push once PRN For Shivering      General:  NAD  Chest: Sternotomy C/D/I, chest tubes with serosanguinous drainage  Heart: S1, S2, RRR  Lungs: CTA B/L, without W/R/R, nonlabored  Abdomen: Soft, ND, NT, positive BS  Extremities: without edema B/L LE, positive DP pulses B/L, warm, well perfused    Does the patient have a history of CHF? no  -If yes, systolic or diastolic    -pre-operative EF: nl    Does the patient currently have Heart Failure?    No  If Yes, specify type:  Acute  /   Chronic   /  Acute on  Chronic     Extubation within 24 hours? Yes    CXR: clear b/l    Does the patient have a history of kidney disease?    no   -CKD stage if applicable:  -Baseline Creatinine: 0.8    Beta Blockers contraindicated for the first 24 hours due to vasopressor/inotropic medication? no  -If on pressors, indication:    Did the patient receive transfusion of blood and/or products? yes  -If yes, indication: anemia due to acute blood loss    DVT PPX: Sequential Compression Device b/l     Whit-operative antibiotics discontinued within 48 hours of CTU admission? Yes, Zinacef     SIRS:  Yes Non-infectious    Acute MI during admission or prior to transfer (within 8 weeks)?  No     Patient care discussed on morning interdisciplinary rounds with CTS team.     Contact: x [phone]

## 2018-04-07 DIAGNOSIS — R73.9 HYPERGLYCEMIA, UNSPECIFIED: ICD-10-CM

## 2018-04-07 DIAGNOSIS — Z29.9 ENCOUNTER FOR PROPHYLACTIC MEASURES, UNSPECIFIED: ICD-10-CM

## 2018-04-07 DIAGNOSIS — Z92.89 PERSONAL HISTORY OF OTHER MEDICAL TREATMENT: ICD-10-CM

## 2018-04-07 DIAGNOSIS — Z96.89 PRESENCE OF OTHER SPECIFIED FUNCTIONAL IMPLANTS: ICD-10-CM

## 2018-04-07 DIAGNOSIS — Z95.1 PRESENCE OF AORTOCORONARY BYPASS GRAFT: ICD-10-CM

## 2018-04-07 LAB
ANION GAP SERPL CALC-SCNC: 9 MMOL/L — SIGNIFICANT CHANGE UP (ref 5–17)
BUN SERPL-MCNC: 31 MG/DL — HIGH (ref 7–23)
CALCIUM SERPL-MCNC: 8.6 MG/DL — SIGNIFICANT CHANGE UP (ref 8.4–10.5)
CHLORIDE SERPL-SCNC: 107 MMOL/L — SIGNIFICANT CHANGE UP (ref 96–108)
CO2 SERPL-SCNC: 24 MMOL/L — SIGNIFICANT CHANGE UP (ref 22–31)
CREAT SERPL-MCNC: 1.2 MG/DL — SIGNIFICANT CHANGE UP (ref 0.5–1.3)
GLUCOSE BLDC GLUCOMTR-MCNC: 112 MG/DL — HIGH (ref 70–99)
GLUCOSE BLDC GLUCOMTR-MCNC: 113 MG/DL — HIGH (ref 70–99)
GLUCOSE BLDC GLUCOMTR-MCNC: 120 MG/DL — HIGH (ref 70–99)
GLUCOSE BLDC GLUCOMTR-MCNC: 121 MG/DL — HIGH (ref 70–99)
GLUCOSE BLDC GLUCOMTR-MCNC: 124 MG/DL — HIGH (ref 70–99)
GLUCOSE BLDC GLUCOMTR-MCNC: 141 MG/DL — HIGH (ref 70–99)
GLUCOSE BLDC GLUCOMTR-MCNC: 159 MG/DL — HIGH (ref 70–99)
GLUCOSE BLDC GLUCOMTR-MCNC: 163 MG/DL — HIGH (ref 70–99)
GLUCOSE BLDC GLUCOMTR-MCNC: 182 MG/DL — HIGH (ref 70–99)
GLUCOSE BLDC GLUCOMTR-MCNC: 190 MG/DL — HIGH (ref 70–99)
GLUCOSE BLDC GLUCOMTR-MCNC: 214 MG/DL — HIGH (ref 70–99)
GLUCOSE BLDC GLUCOMTR-MCNC: 220 MG/DL — HIGH (ref 70–99)
GLUCOSE BLDC GLUCOMTR-MCNC: 221 MG/DL — HIGH (ref 70–99)
GLUCOSE BLDC GLUCOMTR-MCNC: 253 MG/DL — HIGH (ref 70–99)
GLUCOSE BLDC GLUCOMTR-MCNC: 296 MG/DL — HIGH (ref 70–99)
GLUCOSE BLDC GLUCOMTR-MCNC: 73 MG/DL — SIGNIFICANT CHANGE UP (ref 70–99)
GLUCOSE BLDC GLUCOMTR-MCNC: 86 MG/DL — SIGNIFICANT CHANGE UP (ref 70–99)
GLUCOSE SERPL-MCNC: 213 MG/DL — HIGH (ref 70–99)
HCT VFR BLD CALC: 28.7 % — LOW (ref 39–50)
HGB BLD-MCNC: 10 G/DL — LOW (ref 13–17)
MCHC RBC-ENTMCNC: 33.1 PG — SIGNIFICANT CHANGE UP (ref 27–34)
MCHC RBC-ENTMCNC: 34.6 GM/DL — SIGNIFICANT CHANGE UP (ref 32–36)
MCV RBC AUTO: 95.5 FL — SIGNIFICANT CHANGE UP (ref 80–100)
PLATELET # BLD AUTO: 75 K/UL — LOW (ref 150–400)
POTASSIUM SERPL-MCNC: 5.3 MMOL/L — SIGNIFICANT CHANGE UP (ref 3.5–5.3)
POTASSIUM SERPL-SCNC: 5.3 MMOL/L — SIGNIFICANT CHANGE UP (ref 3.5–5.3)
RBC # BLD: 3.01 M/UL — LOW (ref 4.2–5.8)
RBC # FLD: 13.1 % — SIGNIFICANT CHANGE UP (ref 10.3–14.5)
SODIUM SERPL-SCNC: 140 MMOL/L — SIGNIFICANT CHANGE UP (ref 135–145)
WBC # BLD: 14.9 K/UL — HIGH (ref 3.8–10.5)
WBC # FLD AUTO: 14.9 K/UL — HIGH (ref 3.8–10.5)

## 2018-04-07 PROCEDURE — 71045 X-RAY EXAM CHEST 1 VIEW: CPT | Mod: 26

## 2018-04-07 PROCEDURE — 99223 1ST HOSP IP/OBS HIGH 75: CPT

## 2018-04-07 RX ORDER — ATORVASTATIN CALCIUM 80 MG/1
20 TABLET, FILM COATED ORAL AT BEDTIME
Qty: 0 | Refills: 0 | Status: DISCONTINUED | OUTPATIENT
Start: 2018-04-07 | End: 2018-04-10

## 2018-04-07 RX ORDER — INSULIN GLARGINE 100 [IU]/ML
10 INJECTION, SOLUTION SUBCUTANEOUS AT BEDTIME
Qty: 0 | Refills: 0 | Status: DISCONTINUED | OUTPATIENT
Start: 2018-04-07 | End: 2018-04-07

## 2018-04-07 RX ORDER — INSULIN HUMAN 100 [IU]/ML
4 INJECTION, SOLUTION SUBCUTANEOUS
Qty: 100 | Refills: 0 | Status: DISCONTINUED | OUTPATIENT
Start: 2018-04-07 | End: 2018-04-10

## 2018-04-07 RX ADMIN — Medication 2: at 08:24

## 2018-04-07 RX ADMIN — Medication 10 MILLIGRAM(S): at 13:47

## 2018-04-07 RX ADMIN — Medication 100 MILLIGRAM(S): at 05:25

## 2018-04-07 RX ADMIN — FAMOTIDINE 20 MILLIGRAM(S): 10 INJECTION INTRAVENOUS at 17:10

## 2018-04-07 RX ADMIN — Medication 3: at 11:50

## 2018-04-07 RX ADMIN — ATORVASTATIN CALCIUM 20 MILLIGRAM(S): 80 TABLET, FILM COATED ORAL at 21:32

## 2018-04-07 RX ADMIN — INSULIN HUMAN 4 UNIT(S)/HR: 100 INJECTION, SOLUTION SUBCUTANEOUS at 13:31

## 2018-04-07 RX ADMIN — HEPARIN SODIUM 5000 UNIT(S): 5000 INJECTION INTRAVENOUS; SUBCUTANEOUS at 13:47

## 2018-04-07 RX ADMIN — HEPARIN SODIUM 5000 UNIT(S): 5000 INJECTION INTRAVENOUS; SUBCUTANEOUS at 21:32

## 2018-04-07 RX ADMIN — Medication 10 MILLIGRAM(S): at 05:25

## 2018-04-07 RX ADMIN — FAMOTIDINE 20 MILLIGRAM(S): 10 INJECTION INTRAVENOUS at 05:25

## 2018-04-07 RX ADMIN — Medication 100 MILLIGRAM(S): at 13:47

## 2018-04-07 RX ADMIN — Medication 100 MILLIGRAM(S): at 14:59

## 2018-04-07 RX ADMIN — HEPARIN SODIUM 5000 UNIT(S): 5000 INJECTION INTRAVENOUS; SUBCUTANEOUS at 05:25

## 2018-04-07 RX ADMIN — Medication 100 MILLIGRAM(S): at 21:32

## 2018-04-07 RX ADMIN — Medication 81 MILLIGRAM(S): at 11:06

## 2018-04-07 NOTE — PROGRESS NOTE ADULT - PROBLEM SELECTOR PLAN 2
Endo consult called and appreciated   Maintain insulin gtt as per Endo  D/C Lantus   Continue with controlled diabetic steady carb diet Endo consult called and appreciated   Maintain insulin gtt as per Endo; Maintain BFG less than 200   D/C Lantus   Continue with controlled diabetic steady carb diet

## 2018-04-07 NOTE — PROGRESS NOTE ADULT - SUBJECTIVE AND OBJECTIVE BOX
VITAL SIGNS  T(F): 98.4  HR: 70  BP: 102/55  RR: 18  SpO2: 97%    04-05-18 @ 07:01  -  04-06-18 @ 07:00  --------------------------------------------------------  OUT: 2695 mL / NET: -2695 mL    04-06-18 @ 07:01  -  04-07-18 @ 05:03  --------------------------------------------------------  OUT: 1610 mL / NET: -1610 mL        [facility]    CAP[facility]ARY BLOOD GLUCOSE    DIAGNOSTICS    MEDICATIONS  cefuroxime  IVPB 1500 milliGRAM(s) IV Intermittent every 8 hours  docusate sodium 100 milliGRAM(s) Oral three times a day  famotidine    Tablet 20 milliGRAM(s) Oral two times a day  insulin glargine Injectable (LANTUS) 10 Unit(s) SubCutaneous at bedtime  insulin lispro (HumaLOG) corrective regimen sliding scale   SubCutaneous Before meals and at bedtime  metoclopramide Injectable 10 milliGRAM(s) IV Push every 8 hours      PHYSICAL EXAM  GEN: No apparent distress, examined in   NEURO: Non-focal,  A+Ox 3  CV: S1 S2 without rub or murmur  MEDIASTINUM: Sternal incision covered with dressing, CDI, stable  PACING WIRES:   VVI [  ]  setting:      Isolated/Insulated [  ]  DRAINS:  Jose Luis [  ]  Drainage:         Pleural [  ]  Drainage:    PULMONARY:  CTA, Decreased left base   INCENTIVE SPIROMETRY:  ABDOMEN:  Soft, non-tender, non-distended, bowel sounds active x 4  LBM:  : voiding   VASCULAR: +pp +radial  SKIN: Warm, dry, intact   leg incision:  ACE [  ]  MUSCULOSKELETAL:  Moves all extremities equally  PHYSICAL THERAPY REC:  Home [  ]  Home w PT [   ]   CHEMA [   ] VITAL SIGNS  No events overnight  T(F): 98.4  HR: 70  BP: 102/55  RR: 18  SpO2: 97%    04-05-18 @ 07:01  -  04-06-18 @ 07:00  --------------------------------------------------------  OUT: 2695 mL / NET: -2695 mL    04-06-18 @ 07:01  -  04-07-18 @ 05:03  --------------------------------------------------------  OUT: 1610 mL / NET: -1610 mL    LAB                        10.0   14.9  )-----------( 75       ( 07 Apr 2018 05:17 )             28.7   140  |  107  |  31<H>  ----------------------------<  213<H>  5.3   |  24  |  1.20      CAPILLARY BLOOD GLUCOSE  POCT Blood Glucose.: 141 mg/dL (07 Apr 2018 02:33)  POCT Blood Glucose.: 73 mg/dL (07 Apr 2018 01:29)  POCT Blood Glucose.: 86 mg/dL (07 Apr 2018 01:00)  POCT Blood Glucose.: 121 mg/dL (07 Apr 2018 00:07)  POCT Blood Glucose.: 112 mg/dL (06 Apr 2018 23:13)    DIAGNOSTICS   Xray Chest 1 View AP/PA (04.06.18 @ 04:09)   The mediastinal cardiac silhouette is unremarkable. Sternotomy. Right IJ   sheath with tip in superior vena cava. ET tube with tip between level of   clavicles and vahe. Nasogastric tube can be traced below hemidiaphragms   tip not visualized. Left chest tube.No pneumothorax. Clear lungs  No acute osseous finding.   IMPRESSION:  No acute process.    MEDICATIONS  ASA 81  Hep sq  cefuroxime  IVPB 1500 milliGRAM(s) IV Intermittent every 8 hours  docusate sodium 100 milliGRAM(s) Oral three times a day  famotidine    Tablet 20 milliGRAM(s) Oral two times a day  insulin glargine Injectable (LANTUS) 10 Unit(s) SubCutaneous at bedtime  insulin lispro (HumaLOG) corrective regimen sliding scale   SubCutaneous Before meals and at bedtime  metoclopramide Injectable 10 milliGRAM(s) IV Push every 8 hours      PHYSICAL EXAM  GEN: No apparent distress, examined oob to chair  NEURO: Non-focal,  A+Ox 3  CV: S1 S2 without rub or murmur  junctional/SR/VPacing   MEDIASTINUM: Sternal incision covered with dressing, CDI, stable  PACING WIRES:   VVI [x  ]  setting:  [id number] Isolated/Insulated [  ]  DRAINS:  Jose Luis [  ]  Drainage:         Pleural [  ]  Drainage:    PULMONARY:  CTA, Decreased left base   INCENTIVE SPIROMETRY: 500  ABDOMEN:  Soft, non-tender, non-distended, bowel sounds active x 4  LBM: preop  : pierre clear yellow  Vascular: +pp +radial  SKIN: Warm, dry, intact   leg incision:  ACE [  ]  MUSCULOSKELETAL:  Moves all extremities equally  PHYSICAL THERAPY REC:  Home [  ]  Home w PT [  x ]   CHEMA [   ]

## 2018-04-07 NOTE — PROGRESS NOTE ADULT - SUBJECTIVE AND OBJECTIVE BOX
VITAL SIGNS    Subjective: Patient states: "Ok".  No acute distress noted    Telemetry: NSR 70-80     Vital Signs Last 24 Hrs  T(C): 36.9 (18 @ 11:32), Max: 37.1 (18 @ 19:02)  T(F): 98.4 (18 @ 11:32), Max: 98.8 (18 @ 23:00)  HR: 79 (18:32) (59 - 94)  BP: 127/60 (18 @ 11:32) (102/55 - 127/60)  RR: 18 (18 @ 11:32) (18 - 25)  SpO2: 98% (18 @ 11:32) (96% - 100%)            @ 07:01  -   @ 07:00  --------------------------------------------------------  IN: 889 mL / OUT: 1830 mL / NET: -941 mL     @ 07:01  -   @ 12:14  --------------------------------------------------------  IN: 240 mL / OUT: 365 mL / NET: -125 mL    Daily     Daily Weight in k.6 (2018 06:42)    CAPILLARY BLOOD GLUCOSE    POCT Blood Glucose.: 296 mg/dL (2018 12:06)  POCT Blood Glucose.: 253 mg/dL (2018 11:44)  POCT Blood Glucose.: 214 mg/dL (2018 08:02)  POCT Blood Glucose.: 141 mg/dL (2018 02:33)  POCT Blood Glucose.: 73 mg/dL (2018 01:29)  POCT Blood Glucose.: 86 mg/dL (2018 01:00)  POCT Blood Glucose.: 121 mg/dL (2018 00:07)  POCT Blood Glucose.: 112 mg/dL (2018 23:13)  POCT Blood Glucose.: 196 mg/dL (2018 22:06)  POCT Blood Glucose.: 246 mg/dL (2018 21:02)  POCT Blood Glucose.: 317 mg/dL (2018 20:03)  POCT Blood Glucose.: 303 mg/dL (2018 17:37)          Drains:     MS         [  ] Drainage:                 L Pleural  [  ]  Drainage:                R Pleural  [ X ]  Drainage: -560 cc    Pacing Wires        [ X ]   Settings:                                   Isolated  [  ]    Coumadin    [ ] YES          [  ]      NO         Reason:                               PHYSICAL EXAM        Neurology: alert and oriented x 3, nonfocal, no gross deficits    CV:     Sternal Wound:  MSI -->CDI , sternum stable    Lungs: CTA B/L     Abdomen: soft, nontender, nondistended, positive bowel sounds, (+) Flatus; (+) BM     :  Voiding               Extremities:  B/L LE (+) edema; negative calf tenderness; (+) 2 DP palpable              aspirin enteric coated 81 milliGRAM(s) Oral daily  cefuroxime  IVPB 1500 milliGRAM(s) IV Intermittent every 8 hours  docusate sodium 100 milliGRAM(s) Oral three times a day  famotidine    Tablet 20 milliGRAM(s) Oral two times a day  heparin  Injectable 5000 Unit(s) SubCutaneous every 8 hours  insulin glargine Injectable (LANTUS) 10 Unit(s) SubCutaneous at bedtime  insulin lispro (HumaLOG) corrective regimen sliding scale   SubCutaneous Before meals and at bedtime  metoclopramide Injectable 10 milliGRAM(s) IV Push every 8 hours  sodium chloride 0.45%. 1000 milliLiter(s) IV Continuous <Continuous>                              Physical Therapy Rec:   Home  [  ]   Home w/ PT  [  ]  Rehab  [  ]    Discussed with Cardiothoracic Team at AM rounds. VITAL SIGNS    Subjective: Patient states: "Ok".  No acute distress noted    Telemetry: NSR 70-80     Vital Signs Last 24 Hrs  T(C): 36.9 (18 @ 11:32), Max: 37.1 (18 @ 19:02)  T(F): 98.4 (18 @ 11:32), Max: 98.8 (18 @ 23:00)  HR: 79 (18:32) (59 - 94)  BP: 127/60 (18 @ 11:32) (102/55 - 127/60)  RR: 18 (18 @ 11:32) (18 - 25)  SpO2: 98% (18 @ 11:32) (96% - 100%)            @ 07:01  -   @ 07:00  --------------------------------------------------------  IN: 889 mL / OUT: 1830 mL / NET: -941 mL     @ 07:01  -   @ 12:14  --------------------------------------------------------  IN: 240 mL / OUT: 365 mL / NET: -125 mL    Daily     Daily Weight in k.6 (2018 06:42)    CAPILLARY BLOOD GLUCOSE    POCT Blood Glucose.: 296 mg/dL (2018 12:06)  POCT Blood Glucose.: 253 mg/dL (2018 11:44)  POCT Blood Glucose.: 214 mg/dL (2018 08:02)  POCT Blood Glucose.: 141 mg/dL (2018 02:33)  POCT Blood Glucose.: 73 mg/dL (2018 01:29)  POCT Blood Glucose.: 86 mg/dL (2018 01:00)  POCT Blood Glucose.: 121 mg/dL (2018 00:07)  POCT Blood Glucose.: 112 mg/dL (2018 23:13)  POCT Blood Glucose.: 196 mg/dL (2018 22:06)  POCT Blood Glucose.: 246 mg/dL (2018 21:02)  POCT Blood Glucose.: 317 mg/dL (2018 20:03)  POCT Blood Glucose.: 303 mg/dL (2018 17:37)          Drains:         L Pleural  [  ]  Drainage:                R Pleural  [ X ]  Drainage: -560 cc    Pacing Wires        [ X ]   Settings: VVI 70 / V MA 10                                   Isolated  [  ]      PHYSICAL EXAM    Neurology: alert and oriented x 3, nonfocal, no gross deficits    CV: (+) S1 and S2, No murmurs, rubs, gallops or clicks     Sternal Wound:  MSI -->coverlet dressing -->CDI , sternum stable; PW --> EPM     Lungs: CTA B/L; Diminished at base; Right pleural CT --> LWS; no air leak noted; No Sub Q air noted     Abdomen: soft, nontender, nondistended, positive bowel sounds, (+) Flatus; (-) BM     :  Indwelling pierre cath --> SD             Extremities:  B/L LE (+) trace edema; negative calf tenderness; (+) 2 DP palpable; RLE SVG with ACE wrap C/D /I         aspirin enteric coated 81 milliGRAM(s) Oral daily  cefuroxime IVPB 1500 milliGRAM(s) IV Intermittent every 8 hours  docusate sodium 100 milliGRAM(s) Oral three times a day  famotidine Tablet 20 milliGRAM(s) Oral two times a day  heparin  Injectable 5000 Unit(s) SubCutaneous every 8 hours  insulin glargine Injectable (LANTUS) 10 Unit(s) SubCutaneous at bedtime  insulin lispro (HumaLOG) corrective regimen sliding scale   SubCutaneous Before meals and at bedtime  metoclopramide Injectable 10 milliGRAM(s) IV Push every 8 hours  sodium chloride 0.45%. 1000 milliLiter(s) IV Continuous <Continuous>     Physical Therapy Rec:   Home  [  ]   Home w/ PT  [ X ]  Rehab  [  ]    Discussed with Cardiothoracic Team at AM rounds. VITAL SIGNS    Subjective: Patient states: "Ok".  No acute distress noted    Telemetry: NSR 70-80     Vital Signs Last 24 Hrs  T(C): 36.9 (18 @ 11:32), Max: 37.1 (18 @ 19:02)  T(F): 98.4 (18 @ 11:32), Max: 98.8 (18 @ 23:00)  HR: 79 (18:32) (59 - 94)  BP: 127/60 (18 @ 11:32) (102/55 - 127/60)  RR: 18 (18 @ 11:32) (18 - 25)  SpO2: 98% (18 @ 11:32) (96% - 100%)            @ 07:01  -   @ 07:00  --------------------------------------------------------  IN: 889 mL / OUT: 1830 mL / NET: -941 mL     @ 07:01  -   @ 12:14  --------------------------------------------------------  IN: 240 mL / OUT: 365 mL / NET: -125 mL    Daily     Daily Weight in k.6 (2018 06:42)    CAPILLARY BLOOD GLUCOSE    POCT Blood Glucose.: 296 mg/dL (2018 12:06)  POCT Blood Glucose.: 253 mg/dL (2018 11:44)  POCT Blood Glucose.: 214 mg/dL (2018 08:02)  POCT Blood Glucose.: 141 mg/dL (2018 02:33)  POCT Blood Glucose.: 73 mg/dL (2018 01:29)  POCT Blood Glucose.: 86 mg/dL (2018 01:00)  POCT Blood Glucose.: 121 mg/dL (2018 00:07)  POCT Blood Glucose.: 112 mg/dL (2018 23:13)  POCT Blood Glucose.: 196 mg/dL (2018 22:06)  POCT Blood Glucose.: 246 mg/dL (2018 21:02)  POCT Blood Glucose.: 317 mg/dL (2018 20:03)  POCT Blood Glucose.: 303 mg/dL (2018 17:37)          Drains:  L Pleural  [ X ]  Drainage: : -560 cc                   Pacing Wires        [ X ]   Settings: VVI 70 / V MA 10                                   Isolated  [  ]      PHYSICAL EXAM    Neurology: alert and oriented x 3, nonfocal, no gross deficits    CV: (+) S1 and S2, No murmurs, rubs, gallops or clicks     Sternal Wound:  MSI -->coverlet dressing -->CDI , sternum stable; PW --> EPM     Lungs: CTA B/L; Diminished at base; Left pleural CT --> LWS; no air leak noted; No Sub Q air noted     Abdomen: soft, nontender, nondistended, positive bowel sounds, (+) Flatus; (-) BM     :  Indwelling pierre cath --> SD             Extremities:  B/L LE (+) trace edema; negative calf tenderness; (+) 2 DP palpable; RLE SVG with ACE wrap C/D /I         aspirin enteric coated 81 milliGRAM(s) Oral daily  cefuroxime IVPB 1500 milliGRAM(s) IV Intermittent every 8 hours  docusate sodium 100 milliGRAM(s) Oral three times a day  famotidine Tablet 20 milliGRAM(s) Oral two times a day  heparin  Injectable 5000 Unit(s) SubCutaneous every 8 hours  insulin glargine Injectable (LANTUS) 10 Unit(s) SubCutaneous at bedtime  insulin lispro (HumaLOG) corrective regimen sliding scale   SubCutaneous Before meals and at bedtime  metoclopramide Injectable 10 milliGRAM(s) IV Push every 8 hours  sodium chloride 0.45%. 1000 milliLiter(s) IV Continuous <Continuous>     Physical Therapy Rec:   Home  [  ]   Home w/ PT  [ X ]  Rehab  [  ]    Discussed with Cardiothoracic Team at AM rounds.

## 2018-04-07 NOTE — PROGRESS NOTE ADULT - ASSESSMENT
79 y/o mandarin speaking M PMH CAD, some dyspnea on exertion noted, denies angina, had an abnormal echo, stress test as per daughter, found to have TVD on cardiac catheterization 3/2018.      Hospital course:  4/5: C3L   4/6: Transferred to stepdown,  junctional rhythm, VVI pacing at 70 implemented  4/7: Deintensified, pierre to remain until rounds

## 2018-04-07 NOTE — PROGRESS NOTE ADULT - PROBLEM SELECTOR PLAN 2
insulin gtt weaned to off  lantus initiated with sliding scale  FS AC HS   Controlled carb diet  endo follow-up

## 2018-04-07 NOTE — PROGRESS NOTE ADULT - PROBLEM SELECTOR PLAN 1
Continue on ASA 81 mg PO daily   No AV nodals 2/2 SB / V-pacing will continue ot monitor   Start statin   Restart Insulin gtt; Endo consult called and appreciated   Encourage chest PT / pulmonary toileting and Incentive spirometry every 12 hour x 10   Increase activity as tolerated   Maintain Right pleural CT --> LWS  CXR in AM   Glycemic Control   D/C Lantus as per Endo; Maintain on Insulin gtt x 24 hours   D/C Crawford cath   Continue with PUD and DVT prophylaxis   Transferred to step down; will stay in SDU x 24 hours   Pain management  D/C Plan home PT once medically cleared   Plan of care discussed with attending Continue on ASA 81 mg PO daily   No AV nodals 2/2 SB / V-pacing will continue ot monitor   Start statin; Lipitor 20 mg PO daily   Restart Insulin gtt; Endo consult called and appreciated   Encourage chest PT / pulmonary toileting and Incentive spirometry every 12 hour x 10   Increase activity as tolerated   Maintain Right pleural CT --> LWS  CXR in AM   Glycemic Control   D/C Lantus as per Endo; Maintain on Insulin gtt x 24 hours   D/C Crawford cath   Continue with PUD and DVT prophylaxis   Transferred to step down; will stay in SDU x 24 hours   Pain management  D/C Plan home PT once medically cleared   Plan of care discussed with attending

## 2018-04-07 NOTE — PROGRESS NOTE ADULT - ASSESSMENT
77 y/o mandarin speaking M PMH CAD, some dyspnea on exertion noted, denies angina, had an abnormal echo, stress test as per daughter, found to have TVD on cardiac catheterization 3/2018.    On 4/5 s/p CABG  X 3 LIMA / EVH   4/6: Transferred to stepdown,  junctional rhythm, VVI pacing at 70 implemented; No AV nodals at this time 2/2 SB   4/7: De-intensified; Insulin resumed for Hyperglycemia --> House Endo consult called; maintain insulin till 4/8 then plan to weaned off/ D/C Crawford --> TOV   Disposition: Home PT once medically cleared 79 y/o mandarin speaking M PMH CAD, some dyspnea on exertion noted, denies angina, had an abnormal echo, stress test as per daughter, found to have TVD on cardiac catheterization 3/2018.    On 4/5 s/p CABG  X 3 LIMA / EVH   Post op Course:   Pressor support required; weaned off   4/6: Transferred to stepdown,  junctional rhythm, VVI pacing at 70 implemented; No AV nodals at this time 2/2 SB   4/7: De-intensified; Insulin resumed for Hyperglycemia --> House Endo consult called; maintain insulin till 4/8 then plan to weaned off/ D/C Crawford --> TOV   Disposition: Home PT once medically cleared

## 2018-04-08 DIAGNOSIS — E11.59 TYPE 2 DIABETES MELLITUS WITH OTHER CIRCULATORY COMPLICATIONS: ICD-10-CM

## 2018-04-08 DIAGNOSIS — E78.5 HYPERLIPIDEMIA, UNSPECIFIED: ICD-10-CM

## 2018-04-08 LAB
ANION GAP SERPL CALC-SCNC: 13 MMOL/L — SIGNIFICANT CHANGE UP (ref 5–17)
BUN SERPL-MCNC: 24 MG/DL — HIGH (ref 7–23)
CALCIUM SERPL-MCNC: 8.9 MG/DL — SIGNIFICANT CHANGE UP (ref 8.4–10.5)
CHLORIDE SERPL-SCNC: 99 MMOL/L — SIGNIFICANT CHANGE UP (ref 96–108)
CO2 SERPL-SCNC: 28 MMOL/L — SIGNIFICANT CHANGE UP (ref 22–31)
CREAT SERPL-MCNC: 1.04 MG/DL — SIGNIFICANT CHANGE UP (ref 0.5–1.3)
GLUCOSE BLDC GLUCOMTR-MCNC: 102 MG/DL — HIGH (ref 70–99)
GLUCOSE BLDC GLUCOMTR-MCNC: 109 MG/DL — HIGH (ref 70–99)
GLUCOSE BLDC GLUCOMTR-MCNC: 112 MG/DL — HIGH (ref 70–99)
GLUCOSE BLDC GLUCOMTR-MCNC: 116 MG/DL — HIGH (ref 70–99)
GLUCOSE BLDC GLUCOMTR-MCNC: 137 MG/DL — HIGH (ref 70–99)
GLUCOSE BLDC GLUCOMTR-MCNC: 137 MG/DL — HIGH (ref 70–99)
GLUCOSE BLDC GLUCOMTR-MCNC: 145 MG/DL — HIGH (ref 70–99)
GLUCOSE BLDC GLUCOMTR-MCNC: 157 MG/DL — HIGH (ref 70–99)
GLUCOSE BLDC GLUCOMTR-MCNC: 179 MG/DL — HIGH (ref 70–99)
GLUCOSE BLDC GLUCOMTR-MCNC: 214 MG/DL — HIGH (ref 70–99)
GLUCOSE BLDC GLUCOMTR-MCNC: 215 MG/DL — HIGH (ref 70–99)
GLUCOSE BLDC GLUCOMTR-MCNC: 220 MG/DL — HIGH (ref 70–99)
GLUCOSE BLDC GLUCOMTR-MCNC: 243 MG/DL — HIGH (ref 70–99)
GLUCOSE BLDC GLUCOMTR-MCNC: 247 MG/DL — HIGH (ref 70–99)
GLUCOSE BLDC GLUCOMTR-MCNC: 70 MG/DL — SIGNIFICANT CHANGE UP (ref 70–99)
GLUCOSE BLDC GLUCOMTR-MCNC: 85 MG/DL — SIGNIFICANT CHANGE UP (ref 70–99)
GLUCOSE SERPL-MCNC: 98 MG/DL — SIGNIFICANT CHANGE UP (ref 70–99)
HCT VFR BLD CALC: 33.1 % — LOW (ref 39–50)
HGB BLD-MCNC: 11.3 G/DL — LOW (ref 13–17)
MCHC RBC-ENTMCNC: 32.8 PG — SIGNIFICANT CHANGE UP (ref 27–34)
MCHC RBC-ENTMCNC: 34.3 GM/DL — SIGNIFICANT CHANGE UP (ref 32–36)
MCV RBC AUTO: 95.9 FL — SIGNIFICANT CHANGE UP (ref 80–100)
PLATELET # BLD AUTO: 90 K/UL — LOW (ref 150–400)
POTASSIUM SERPL-MCNC: 3.7 MMOL/L — SIGNIFICANT CHANGE UP (ref 3.5–5.3)
POTASSIUM SERPL-SCNC: 3.7 MMOL/L — SIGNIFICANT CHANGE UP (ref 3.5–5.3)
RBC # BLD: 3.45 M/UL — LOW (ref 4.2–5.8)
RBC # FLD: 13 % — SIGNIFICANT CHANGE UP (ref 10.3–14.5)
SODIUM SERPL-SCNC: 140 MMOL/L — SIGNIFICANT CHANGE UP (ref 135–145)
WBC # BLD: 15.4 K/UL — HIGH (ref 3.8–10.5)
WBC # FLD AUTO: 15.4 K/UL — HIGH (ref 3.8–10.5)

## 2018-04-08 PROCEDURE — 99233 SBSQ HOSP IP/OBS HIGH 50: CPT

## 2018-04-08 PROCEDURE — 99223 1ST HOSP IP/OBS HIGH 75: CPT

## 2018-04-08 PROCEDURE — 71045 X-RAY EXAM CHEST 1 VIEW: CPT | Mod: 26

## 2018-04-08 RX ORDER — INSULIN GLARGINE 100 [IU]/ML
24 INJECTION, SOLUTION SUBCUTANEOUS
Qty: 0 | Refills: 0 | Status: DISCONTINUED | OUTPATIENT
Start: 2018-04-08 | End: 2018-04-10

## 2018-04-08 RX ORDER — INSULIN LISPRO 100/ML
5 VIAL (ML) SUBCUTANEOUS
Qty: 0 | Refills: 0 | Status: DISCONTINUED | OUTPATIENT
Start: 2018-04-08 | End: 2018-04-09

## 2018-04-08 RX ADMIN — FAMOTIDINE 20 MILLIGRAM(S): 10 INJECTION INTRAVENOUS at 17:10

## 2018-04-08 RX ADMIN — HEPARIN SODIUM 5000 UNIT(S): 5000 INJECTION INTRAVENOUS; SUBCUTANEOUS at 21:47

## 2018-04-08 RX ADMIN — Medication 2: at 21:47

## 2018-04-08 RX ADMIN — Medication 100 MILLIGRAM(S): at 05:16

## 2018-04-08 RX ADMIN — Medication 1: at 16:46

## 2018-04-08 RX ADMIN — Medication 100 MILLIGRAM(S): at 21:47

## 2018-04-08 RX ADMIN — Medication 81 MILLIGRAM(S): at 11:20

## 2018-04-08 RX ADMIN — HEPARIN SODIUM 5000 UNIT(S): 5000 INJECTION INTRAVENOUS; SUBCUTANEOUS at 13:20

## 2018-04-08 RX ADMIN — Medication 5 UNIT(S): at 11:46

## 2018-04-08 RX ADMIN — Medication 5 UNIT(S): at 16:47

## 2018-04-08 RX ADMIN — ATORVASTATIN CALCIUM 20 MILLIGRAM(S): 80 TABLET, FILM COATED ORAL at 21:47

## 2018-04-08 RX ADMIN — Medication 100 MILLIGRAM(S): at 13:20

## 2018-04-08 RX ADMIN — FAMOTIDINE 20 MILLIGRAM(S): 10 INJECTION INTRAVENOUS at 05:16

## 2018-04-08 RX ADMIN — INSULIN GLARGINE 24 UNIT(S): 100 INJECTION, SOLUTION SUBCUTANEOUS at 11:46

## 2018-04-08 RX ADMIN — HEPARIN SODIUM 5000 UNIT(S): 5000 INJECTION INTRAVENOUS; SUBCUTANEOUS at 05:16

## 2018-04-08 NOTE — PROGRESS NOTE ADULT - SUBJECTIVE AND OBJECTIVE BOX
· Subjective and Objective:   [facility] CARDIOLOGY CONSULTANTS:    Theodore Rinaldi, Rodney Mueller Pannella, Patel, Savella, Goodger      [phone]    CHIEF COMPLAINT: Patient is a 78y old  Male who presents with a chief complaint of As per daughter, is having bypass surgery. (2018 10:05)    FOLLOW UP: s/p 3V CABG, doing well    INTERIM HISTORY: Pt has no complaints today, there were no overnight events    REVIEW OF SYSTEMS:  CONSTITUTIONAL: No fever, weight loss, or fatigue  EYES: No eye pain, visual disturbances, or discharge  ENMT:  No difficulty hearing, tinnitus, vertigo; No sinus or throat pain  NECK: No pain or stiffness  RESPIRATORY: denies cough,No wheezing, chills or hemoptysis; No shortness of breath  CARDIOVASCULAR: No chest pain,No palpitations, dizziness, or leg swelling  GASTROINTESTINAL: No abdominal or epigastric pain. No nausea, vomiting, or hematemesis; No diarrhea , no constipation. No melena or hematochezia.  GENITOURINARY: No dysuria, frequency, hematuria, or incontinence  NEUROLOGICAL: No headaches, memory loss, loss of strength, numbness, or tremors  SKIN: No itching, burning, rashes, or lesions   LYMPH NODES: No enlarged glands  ENDOCRINE: No heat or cold intolerance; No hair loss  MUSCULOSKELETAL: No joint pain or swelling; No muscle, back, or extremity pain  PSYCHIATRIC: No depression, anxiety, mood swings, or difficulty sleeping  HEME/LYMPH: No easy bruising, or bleeding gums  ALLERGY AND IMMUNOLOGIC: No hives or eczema          PAST MEDICAL & SURGICAL HISTORY:  Diabetes mellitus  HLD (hyperlipidemia)  S/P ORIF (open reduction internal fixation) fracture: left arm  S/P eye surgery: strabismus repair      MEDICATIONS  (STANDING):  aspirin enteric coated 81 milliGRAM(s) Oral daily  atorvastatin 20 milliGRAM(s) Oral at bedtime  docusate sodium 100 milliGRAM(s) Oral three times a day  famotidine    Tablet 20 milliGRAM(s) Oral two times a day  heparin  Injectable 5000 Unit(s) SubCutaneous every 8 hours  insulin Infusion 4 Unit(s)/Hr (4 mL/Hr) IV Continuous <Continuous>  insulin lispro (HumaLOG) corrective regimen sliding scale   SubCutaneous Before meals and at bedtime  sodium chloride 0.45%. 1000 milliLiter(s) (10 mL/Hr) IV Continuous <Continuous>      Allergies    No Known Allergies    Intolerances                              11.3   15.4  )-----------( 90       ( 2018 05:55 )             33.1       04-08    140  |  99  |  24<H>  ----------------------------<  98  3.7   |  28  |  1.04    Ca    8.9      2018 05:55                                  Daily     Daily Weight in k (2018 08:00)    I&O's Summary    2018 07:01  -  2018 07:00  --------------------------------------------------------  IN: 794 mL / OUT: 2455 mL / NET: -1661 mL    2018 07:01  -  2018 10:17  --------------------------------------------------------  IN: 0 mL / OUT: 415 mL / NET: -415 mL        Vital Signs Last 24 Hrs  T(C): 36.8 (2018 07:54), Max: 37 (2018 19:17)  T(F): 98.2 (2018 07:54), Max: 98.6 (2018 19:17)  HR: 86 (2018 07:54) (79 - 86)  BP: 144/70 (2018 07:54) (104/55 - 144/70)  BP(mean): 87 (2018 03:00) (82 - 87)  RR: 18 (2018 07:54) (18 - 18)  SpO2: 98% (2018 07:54) (96% - 98%)    PHYSICAL EXAM:   · Constitutional	Well-developed, well nourished  · Eyes	EOMI; PERRL; no drainage or redness  · ENMT	No oral lesions; no gross abnormalities  · Neck	No bruits; no thyromegaly or nodules  · Respiratory	Normal breath sounds b/l, No RRW  · Cardiovascular	Regular rate & rhythm, normal S1, S2; no murmurs, gallops or rubs; no S3, S4  · Gastrointestinal	Soft, non-tender, no hepatosplenomegaly, normal bowel sounds  · Extremities	No cyanosis, clubbing or edema  · Vascular	Equal and normal pulses (carotid, femoral, dorsalis pedis)  · Neurological	Alert & oriented; no sensory, motor or coordination deficits, normal reflexes

## 2018-04-08 NOTE — PROGRESS NOTE ADULT - ASSESSMENT
77 y/o mandarin speaking M PMH CAD, some dyspnea on exertion noted, denies angina, had an abnormal echo, stress test as per daughter, found to have TVD on cardiac catheterization 3/2018.    On 4/5 s/p CABG  X 3 LIMA / EVH   Post op Course:   Pressor support required; weaned off   4/6: Transferred to stepdown,  junctional rhythm, VVI pacing at 70 implemented; No AV nodals at this time 2/2 SB   4/7: De-intensified; Insulin resumed for Hyperglycemia --> House Endo consult called; maintain insulin till 4/8 then plan to weaned off/ D/C Crawford --> TOV   4/8  hyperglycemic  Insulin gtt continued  House ENDo added lantus and premeal,  VSS   rt chest tube kept in for drainage,  NSR

## 2018-04-08 NOTE — CONSULT NOTE ADULT - ASSESSMENT
Pt is a 79 y/o M with PMH HLD, DM who presented with TVD now s/p 3V CABG 4/5/2018, normal LV function.  Presently doing well    - c/w ASA  - start statin  - CTS care appreciated  - monitor electrolytes and volume status  - will follow along with you
79 yo man with Type 2 diabetes admitted for scheduled CABG for triple vessel disease.  Course complicated by hyperglycemia requiring insulin drip

## 2018-04-08 NOTE — PROGRESS NOTE ADULT - PROBLEM SELECTOR PLAN 1
No AV nodals   for Sinus bradyLipitor 20 mg PO daily   Restart Insulin gtt; Endo consult called and appreciated  Maintain Right pleural CT --> LWS  D/C Plan home PT

## 2018-04-08 NOTE — CONSULT NOTE ADULT - SUBJECTIVE AND OBJECTIVE BOX
· Subjective and Objective:   [facility] CARDIOLOGY CONSULTANTS:    Theodore Rinaldi, Ami, Rodney, Tima Juarez, Leyla Luis      [phone]    CHIEF COMPLAINT: Patient is a 78y old  Male who presents with a chief complaint of As per daughter, is having bypass surgery. (2018 10:05)    HPI: This is a 79 yo Chinese male, speaks Mandarin only ( data obtained from CHART) with PMH of HLD and DM type 2 ( well managed, w/o complications, last A1C unknown, dx 20 years ago), denies any significant CV PMH or FH.  Presents to cardiologist, dr MARLA Alfred, w/o any c/c for routine visit.  He had abnormal CT coronaries showing calcium score of 1078 and Significant stenosis of the distal right coronary artery (greater  than 70%).  NST on  showed: medium sized, mod to severe defect in inferior distal, anterior, and apical walls that are mostly fixed, and only partially correct with prone imaging.  EF 52%. Findings most consisted with minimal paulina-infarct ischemia. cardiac cath TVD.  He is now s/p 3V CABG 2018  Presently asymptomatic, denies chest pain, dyspnea, dizziness, palpitations, orthopnea, N&V, HA, abd pain, fever/chills.     INTERIM HISTORY: Pt has no complaints today, there were no overnight events    TELEMETRY: nsr 60-80'S    REVIEW OF SYSTEMS:  CONSTITUTIONAL: No fever, weight loss, or fatigue  EYES: No eye pain, visual disturbances, or discharge  ENMT:  No difficulty hearing, tinnitus, vertigo; No sinus or throat pain  NECK: No pain or stiffness  RESPIRATORY: denies cough,No wheezing, chills or hemoptysis; No shortness of breath  CARDIOVASCULAR: No chest pain,No palpitations, dizziness, or leg swelling  GASTROINTESTINAL: No abdominal or epigastric pain. No nausea, vomiting, or hematemesis; No diarrhea , no constipation. No melena or hematochezia.  GENITOURINARY: No dysuria, frequency, hematuria, or incontinence  NEUROLOGICAL: No headaches, memory loss, loss of strength, numbness, or tremors  SKIN: No itching, burning, rashes, or lesions   LYMPH NODES: No enlarged glands  ENDOCRINE: No heat or cold intolerance; No hair loss  MUSCULOSKELETAL: No joint pain or swelling; No muscle, back, or extremity pain  PSYCHIATRIC: No depression, anxiety, mood swings, or difficulty sleeping  HEME/LYMPH: No easy bruising, or bleeding gums  ALLERGY AND IMMUNOLOGIC: No hives or eczema          PAST MEDICAL & SURGICAL HISTORY:  Diabetes mellitus  HLD (hyperlipidemia)  S/P ORIF (open reduction internal fixation) fracture: left arm  S/P eye surgery: strabismus repair      MEDICATIONS  (STANDING):  aspirin enteric coated 81 milliGRAM(s) Oral daily  cefuroxime  IVPB 1500 milliGRAM(s) IV Intermittent every 8 hours  docusate sodium 100 milliGRAM(s) Oral three times a day  famotidine    Tablet 20 milliGRAM(s) Oral two times a day  heparin  Injectable 5000 Unit(s) SubCutaneous every 8 hours  insulin glargine Injectable (LANTUS) 10 Unit(s) SubCutaneous at bedtime  insulin lispro (HumaLOG) corrective regimen sliding scale   SubCutaneous Before meals and at bedtime  metoclopramide Injectable 10 milliGRAM(s) IV Push every 8 hours  sodium chloride 0.45%. 1000 milliLiter(s) (10 mL/Hr) IV Continuous <Continuous>      Allergies    No Known Allergies    Intolerances                              10.0   14.9  )-----------( 75       ( 2018 05:17 )             28.7       04-07    140  |  107  |  31<H>  ----------------------------<  213<H>  5.3   |  24  |  1.20    Ca    8.6      2018 05:17    TPro  5.8<L>  /  Alb  4.5  /  TBili  1.5<H>  /  DBili  x   /  AST  28  /  ALT  13  /  AlkPhos  19<L>  04-06      LIVER FUNCTIONS - ( 2018 01:13 )  Alb: 4.5 g/dL / Pro: 5.8 g/dL / ALK PHOS: 19 U/L / ALT: 13 U/L RC / AST: 28 U/L / GGT: x             PT/INR - ( 2018 01:13 )   PT: 14.3 sec;   INR: 1.32 ratio         PTT - ( 2018 01:13 )  PTT:38.0 sec    CARDIAC MARKERS ( 2018 01:13 )  x     / 0.49 ng/mL / 282 U/L / x     / 18.1 ng/mL  CARDIAC MARKERS ( 2018 19:51 )  x     / 0.59 ng/mL / 297 U/L / x     / 24.1 ng/mL                    Daily     Daily Weight in k.6 (2018 06:42)    I&O's Summary    2018 07:01  -  2018 07:00  --------------------------------------------------------  IN: 889 mL / OUT: 1830 mL / NET: -941 mL    2018 07:01  -  2018 12:06  --------------------------------------------------------  IN: 240 mL / OUT: 365 mL / NET: -125 mL        Vital Signs Last 24 Hrs  T(C): 36.9 (2018 11:32), Max: 37.1 (2018 19:02)  T(F): 98.4 (2018 11:32), Max: 98.8 (2018 23:00)  HR: 79 (2018 11:32) (59 - 94)  BP: 127/60 (2018 11:32) (102/55 - 127/60)  BP(mean): 73 (2018 04:00) (73 - 80)  RR: 18 (2018 11:32) (18 - 25)  SpO2: 98% (2018 11:32) (96% - 100%)    PHYSICAL EXAM:   · Constitutional	Well-developed, well nourished  · Eyes	EOMI; PERRL; no drainage or redness  · ENMT	No oral lesions; no gross abnormalities  · Neck	No bruits; no thyromegaly or nodules  · Respiratory	Normal breath sounds b/l, No RRW  · Cardiovascular	Regular rate & rhythm, normal S1, S2; no murmurs, gallops or rubs; no S3, S4  · Gastrointestinal	Soft, non-tender, no hepatosplenomegaly, normal bowel sounds  · Extremities	No cyanosis, clubbing or edema  · Vascular	Equal and normal pulses (carotid, femoral, dorsalis pedis)  · Neurological	Alert & oriented; no sensory, motor or coordination deficits, normal reflexes
HPI:  77 y/o mandarin speaking M [facility] CAD, some dyspnea on exertion noted, denies angina, had an abnormal echo, stress test as per daughter, found to have TVD on cardiac catheterization 3/2018.  Presents today for CABG X 3. (02 Apr 2018 10:05)    Endocrine History: Patient is Mandarin speaking, [place]  services was utilized.   ID [id number]  79 yo  man with type 2 diabetes diagnosed over 20 years ago.  He states he checks his sugars once a day in the morning and the values are in the 120's.  He denies having any hypoglycemic events and states he is NOT on insulin.  He is on medicines that "bring sugar down," but does not know the specific names.  Per pre-surgical testing consult, he is on Metformin and glimepiride.  He had strabismus surgery around 20 years ago but has not had a dilated eye exam since that time.  Diet: little amounts of rice and seldom has sweets    PAST MEDICAL & SURGICAL HISTORY:  Diabetes mellitus  HLD (hyperlipidemia)  S/P ORIF (open reduction internal fixation) fracture: left arm  S/P eye surgery: strabismus repair      FAMILY HISTORY:  No pertinent family history in first degree relatives    Social History:  Former smoker, quit 30 years ago  No alcohol  No recreational drugs    Outpatient Medications:  · 	metFORMIN 500 mg oral tablet, extended release: 1 tab(s) orally once a day  · 	Aspirin Enteric Coated 81 mg oral delayed release tablet: 1 tab(s) orally once a day  · 	glimepiride 4 mg oral tablet: 1 tab(s) orally once a day  ·	simvastatin 20 mg oral tablet: 1 tab(s) orally once a day (at bedtime)MEDICATIONS  (STANDING):    Standing Medications  aspirin enteric coated 81 milliGRAM(s) Oral daily  atorvastatin 20 milliGRAM(s) Oral at bedtime  docusate sodium 100 milliGRAM(s) Oral three times a day  famotidine    Tablet 20 milliGRAM(s) Oral two times a day  heparin  Injectable 5000 Unit(s) SubCutaneous every 8 hours  insulin Infusion 4 Unit(s)/Hr (4 mL/Hr) IV Continuous <Continuous>  insulin lispro (HumaLOG) corrective regimen sliding scale   SubCutaneous Before meals and at bedtime  sodium chloride 0.45%. 1000 milliLiter(s) (10 mL/Hr) IV Continuous <Continuous>    Allergies  No Known Allergies    Review of Systems:  Constitutional: No fever  Eyes: No blurry vision  Neuro: No tremors  HEENT: No pain  Cardiovascular: some post-surgical chest pain, no palpitations  Respiratory: No SOB, no cough  GI: No nausea, vomiting, abdominal pain  : No dysuria  Skin: no rash  Endocrine: no polyuria, polydipsia  ALL OTHER SYSTEMS REVIEWED AND NEGATIVE    PHYSICAL EXAM:  VITALS: T(C): 36.8 (04-08-18 @ 07:54)  T(F): 98.2 (04-08-18 @ 07:54), Max: 98.6 (04-07-18 @ 19:17)  HR: 86 (04-08-18 @ 07:54) (79 - 86)  BP: 144/70 (04-08-18 @ 07:54) (104/55 - 144/70)  RR:  (18 - 18)  SpO2:  (96% - 98%)  Wt(kg): --  GENERAL: NAD, well-groomed, well-developed  EYES: No proptosis, no lid lag, anicteric  HEENT:  Atraumatic, Normocephalic, moist mucous membranes  RESPIRATORY: Clear to auscultation bilaterally; No rales, rhonchi, wheezing, or rubs; + Chest tube  CARDIOVASCULAR: Regular rate and rhythm; No murmurs; no peripheral edema-RLE in pressure dressing; sinus on monitor; clean sternal dressing  GI: Soft, nontender, non distended, normal bowel sounds  SKIN: Dry, intact, No rashes or lesions  MUSCULOSKELETAL: Full range of motion, normal strength      POCT Blood Glucose.: 247 mg/dL (04-08-18 @ 09:05)  POCT Blood Glucose.: 137 mg/dL (04-08-18 @ 07:57)  POCT Blood Glucose.: 112 mg/dL (04-08-18 @ 06:07)  POCT Blood Glucose.: 102 mg/dL (04-08-18 @ 05:05)  POCT Blood Glucose.: 109 mg/dL (04-08-18 @ 04:09)  POCT Blood Glucose.: 145 mg/dL (04-08-18 @ 03:01)  POCT Blood Glucose.: 137 mg/dL (04-08-18 @ 02:00)  POCT Blood Glucose.: 116 mg/dL (04-08-18 @ 01:01)  POCT Blood Glucose.: 70 mg/dL (04-08-18 @ 00:35)  POCT Blood Glucose.: 85 mg/dL (04-08-18 @ 00:04)  POCT Blood Glucose.: 112 mg/dL (04-07-18 @ 23:11)  POCT Blood Glucose.: 113 mg/dL (04-07-18 @ 22:08)  POCT Blood Glucose.: 120 mg/dL (04-07-18 @ 21:04)  POCT Blood Glucose.: 124 mg/dL (04-07-18 @ 19:39)  POCT Blood Glucose.: 159 mg/dL (04-07-18 @ 18:32)  POCT Blood Glucose.: 182 mg/dL (04-07-18 @ 17:29)  POCT Blood Glucose.: 190 mg/dL (04-07-18 @ 16:36)  POCT Blood Glucose.: 220 mg/dL (04-07-18 @ 15:34)  POCT Blood Glucose.: 163 mg/dL (04-07-18 @ 14:35)  POCT Blood Glucose.: 221 mg/dL (04-07-18 @ 13:35)  POCT Blood Glucose.: 296 mg/dL (04-07-18 @ 12:06)  POCT Blood Glucose.: 253 mg/dL (04-07-18 @ 11:44)  POCT Blood Glucose.: 214 mg/dL (04-07-18 @ 08:02)  POCT Blood Glucose.: 141 mg/dL (04-07-18 @ 02:33)  POCT Blood Glucose.: 73 mg/dL (04-07-18 @ 01:29)  POCT Blood Glucose.: 86 mg/dL (04-07-18 @ 01:00)  POCT Blood Glucose.: 121 mg/dL (04-07-18 @ 00:07)  POCT Blood Glucose.: 112 mg/dL (04-06-18 @ 23:13)  POCT Blood Glucose.: 196 mg/dL (04-06-18 @ 22:06)  POCT Blood Glucose.: 246 mg/dL (04-06-18 @ 21:02)  POCT Blood Glucose.: 317 mg/dL (04-06-18 @ 20:03)  POCT Blood Glucose.: 303 mg/dL (04-06-18 @ 17:37)  POCT Blood Glucose.: 146 mg/dL (04-06-18 @ 12:00)  POCT Blood Glucose.: 88 mg/dL (04-06-18 @ 11:03)  POCT Blood Glucose.: 101 mg/dL (04-06-18 @ 10:03)  POCT Blood Glucose.: 115 mg/dL (04-06-18 @ 09:12)  POCT Blood Glucose.: 138 mg/dL (04-06-18 @ 08:24)  POCT Blood Glucose.: 134 mg/dL (04-06-18 @ 03:54)  POCT Blood Glucose.: 167 mg/dL (04-06-18 @ 02:06)  POCT Blood Glucose.: 192 mg/dL (04-06-18 @ 00:14)  POCT Blood Glucose.: 232 mg/dL (04-05-18 @ 22:12)  POCT Blood Glucose.: 180 mg/dL (04-05-18 @ 11:16)                          11.3   15.4  )-----------( 90       ( 08 Apr 2018 05:55 )             33.1       04-08    140  |  99  |  24<H>  ----------------------------<  98  3.7   |  28  |  1.04    EGFR if : 80  EGFR if non : 69    Ca    8.9      04-08    TPro  5.8<L>  /  Alb  4.5  /  TBili  1.5<H>  /  DBili  x   /  AST  28  /  ALT  13  /  AlkPhos  19<L>  04-06    Hemoglobin A1C, Whole Blood: 7.5 % <H> [4.0 - 5.6] (04-02-18 @ 15:27)

## 2018-04-08 NOTE — PROGRESS NOTE ADULT - SUBJECTIVE AND OBJECTIVE BOX
VITAL SIGNS    Telemetry:  SR  80-90    Vital Signs Last 24 Hrs  T(C): 37.1 (18 @ 11:40), Max: 37.1 (18 @ 11:40)  T(F): 98.7 (18 @ 11:40), Max: 98.7 (18 @ 11:40)  HR: 90 (18 @ 11:40) (84 - 90)  BP: 124/62 (18 @ 11:40) (104/55 - 144/70)  RR: 18 (18 @ 11:40) (18 - 18)  SpO2: 97% (18 @ 11:40) (96% - 98%)             Daily Weight in k (2018 08:00)        CAPILLARY BLOOD GLUCOSE      POCT Blood Glucose.: 214 mg/dL (2018 11:10)  POCT Blood Glucose.: 220 mg/dL (2018 10:08)  POCT Blood Glucose.: 247 mg/dL (2018 09:05)  POCT Blood Glucose.: 137 mg/dL (2018 07:57)  POCT Blood Glucose.: 112 mg/dL (2018 06:07)  POCT Blood Glucose.: 102 mg/dL (2018 05:05)  POCT Blood Glucose.: 109 mg/dL (2018 04:09)  POCT Blood Glucose.: 145 mg/dL (2018 03:01)  POCT Blood Glucose.: 137 mg/dL (2018 02:00)  POCT Blood Glucose.: 116 mg/dL (2018 01:01)  POCT Blood Glucose.: 70 mg/dL (2018 00:35)  POCT Blood Glucose.: 85 mg/dL (2018 00:04)  POCT Blood Glucose.: 112 mg/dL (2018 23:11)  POCT Blood Glucose.: 113 mg/dL (2018 22:08)  POCT Blood Glucose.: 120 mg/dL (2018 21:04)  POCT Blood Glucose.: 124 mg/dL (2018 19:39)  POCT Blood Glucose.: 159 mg/dL (2018 18:32)  POCT Blood Glucose.: 182 mg/dL (2018 17:29)  POCT Blood Glucose.: 190 mg/dL (2018 16:36)  POCT Blood Glucose.: 220 mg/dL (2018 15:34)  POCT Blood Glucose.: 163 mg/dL (2018 14:35)  POCT Blood Glucose.: 221 mg/dL (2018 13:35)  POCT Blood Glucose.: 296 mg/dL (2018 12:06)          Drains:     MS         [  ] Drainage:                 L Pleural  [  ]  Drainage:                R Pleural  [  ]  Drainage:    Pacing Wires        [  ]   Settings:                                  Isolated  [  ]    Coumadin    [ ] YES          [  ]      NO         Reason:                         PHYSICAL EXAM    Neurology: alert and oriented x 3, moves all extremities with no defecits  CV :  RRR  Sternal Wound :  CDI , Stable  Lungs:   CTA B/L  Abdomen: soft, nontender, nondistended, positive bowel sounds, last bowel movement   Extremities: VITAL SIGNS    Telemetry:  SR  80-90    Vital Signs Last 24 Hrs  T(C): 37.1 (18 @ 11:40), Max: 37.1 (18 @ 11:40)  T(F): 98.7 (18 @ 11:40), Max: 98.7 (18 @ 11:40)  HR: 90 (18 @ 11:40) (84 - 90)  BP: 124/62 (18 @ 11:40) (104/55 - 144/70)  RR: 18 (18 @ 11:40) (18 - 18)  SpO2: 97% (18 @ 11:40) (96% - 98%)             Daily Weight in k (2018 08:00)        CAPILLARY BLOOD GLUCOSE      POCT Blood Glucose.: 214 mg/dL (2018 11:10)  POCT Blood Glucose.: 220 mg/dL (2018 10:08)  POCT Blood Glucose.: 247 mg/dL (2018 09:05)  POCT Blood Glucose.: 137 mg/dL (2018 07:57)  POCT Blood Glucose.: 112 mg/dL (2018 06:07)  POCT Blood Glucose.: 102 mg/dL (2018 05:05)  POCT Blood Glucose.: 109 mg/dL (2018 04:09)  POCT Blood Glucose.: 145 mg/dL (2018 03:01)  POCT Blood Glucose.: 137 mg/dL (2018 02:00)  POCT Blood Glucose.: 116 mg/dL (2018 01:01)  POCT Blood Glucose.: 70 mg/dL (2018 00:35)  POCT Blood Glucose.: 85 mg/dL (2018 00:04)  POCT Blood Glucose.: 112 mg/dL (2018 23:11)  POCT Blood Glucose.: 113 mg/dL (2018 22:08)  POCT Blood Glucose.: 120 mg/dL (2018 21:04)  POCT Blood Glucose.: 124 mg/dL (2018 19:39)  POCT Blood Glucose.: 159 mg/dL (2018 18:32)  POCT Blood Glucose.: 182 mg/dL (2018 17:29)  POCT Blood Glucose.: 190 mg/dL (2018 16:36)  POCT Blood Glucose.: 220 mg/dL (2018 15:34)  POCT Blood Glucose.: 163 mg/dL (2018 14:35)  POCT Blood Glucose.: 221 mg/dL (2018 13:35)  POCT Blood Glucose.: 296 mg/dL (2018 12:06)          Drains:                R Pleural   lws   200 cc    Pacing Wires     isolated                    PHYSICAL EXAM    Neurology: alert and oriented x 3, moves all extremities with no defecits  CV :  RRR  Sternal Wound :  CDI , Stable  with pw  Lungs:   CTA B/L  Abdomen: soft, nontender, nondistended, positive bowel sounds, last bowel movement /  Extremities:     trace bl  le edema    no calf tenderness

## 2018-04-08 NOTE — PROGRESS NOTE ADULT - PROBLEM SELECTOR PLAN 2
Endo consult called and appreciated   Maintain insulin gtt as per Endo    lantus started   hope to wean Insulin gtt Endo consult called and appreciated     lantus started   hope to wean Insulin gtt

## 2018-04-08 NOTE — PROGRESS NOTE ADULT - ASSESSMENT
Pt is a 77 y/o M with PMH HLD, DM who presented with TVD now s/p 3V CABG 4/5/2018, normal LV function.  Presently doing well    - c/w ASA  - start statin  - CTS care appreciated  - monitor electrolytes and volume status  - Further cardiac workup will depend on clinical course  - will follow along with you

## 2018-04-08 NOTE — CONSULT NOTE ADULT - PROBLEM SELECTOR RECOMMENDATION 9
-patient continues on an insulin gtt at a rate of 3 units per hour with most recent fingerstick of 247.  Would continue with insulin gtt per protocol and ensure that his pain is controlled.  His 24 hour insulin requirements are approximately 31 units.  When he is ready to be transitioned, I would recommend Lantus 24 units daily and humalog 5 units TID before meals  -he should be on a low correction sliding scale with bedtime low scale as well  -consistent carbohydrate diet  -for discharge: can consider re-starting Metformin.  Glimepiride not idea in elderly patient we will have to assess his regimen further.

## 2018-04-09 LAB
ANION GAP SERPL CALC-SCNC: 11 MMOL/L — SIGNIFICANT CHANGE UP (ref 5–17)
BASOPHILS # BLD AUTO: 0 K/UL — SIGNIFICANT CHANGE UP (ref 0–0.2)
BASOPHILS NFR BLD AUTO: 0 % — SIGNIFICANT CHANGE UP (ref 0–2)
BUN SERPL-MCNC: 21 MG/DL — SIGNIFICANT CHANGE UP (ref 7–23)
CALCIUM SERPL-MCNC: 9 MG/DL — SIGNIFICANT CHANGE UP (ref 8.4–10.5)
CHLORIDE SERPL-SCNC: 100 MMOL/L — SIGNIFICANT CHANGE UP (ref 96–108)
CO2 SERPL-SCNC: 26 MMOL/L — SIGNIFICANT CHANGE UP (ref 22–31)
CREAT SERPL-MCNC: 0.92 MG/DL — SIGNIFICANT CHANGE UP (ref 0.5–1.3)
EOSINOPHIL # BLD AUTO: 0.1 K/UL — SIGNIFICANT CHANGE UP (ref 0–0.5)
EOSINOPHIL NFR BLD AUTO: 1.2 % — SIGNIFICANT CHANGE UP (ref 0–6)
GLUCOSE BLDC GLUCOMTR-MCNC: 133 MG/DL — HIGH (ref 70–99)
GLUCOSE BLDC GLUCOMTR-MCNC: 163 MG/DL — HIGH (ref 70–99)
GLUCOSE BLDC GLUCOMTR-MCNC: 211 MG/DL — HIGH (ref 70–99)
GLUCOSE BLDC GLUCOMTR-MCNC: 291 MG/DL — HIGH (ref 70–99)
GLUCOSE SERPL-MCNC: 159 MG/DL — HIGH (ref 70–99)
HCT VFR BLD CALC: 34.2 % — LOW (ref 39–50)
HGB BLD-MCNC: 11.9 G/DL — LOW (ref 13–17)
LYMPHOCYTES # BLD AUTO: 1.3 K/UL — SIGNIFICANT CHANGE UP (ref 1–3.3)
LYMPHOCYTES # BLD AUTO: 14.8 % — SIGNIFICANT CHANGE UP (ref 13–44)
MCHC RBC-ENTMCNC: 32.9 PG — SIGNIFICANT CHANGE UP (ref 27–34)
MCHC RBC-ENTMCNC: 34.7 GM/DL — SIGNIFICANT CHANGE UP (ref 32–36)
MCV RBC AUTO: 94.8 FL — SIGNIFICANT CHANGE UP (ref 80–100)
MONOCYTES # BLD AUTO: 0.8 K/UL — SIGNIFICANT CHANGE UP (ref 0–0.9)
MONOCYTES NFR BLD AUTO: 9.4 % — SIGNIFICANT CHANGE UP (ref 2–14)
NEUTROPHILS # BLD AUTO: 6.4 K/UL — SIGNIFICANT CHANGE UP (ref 1.8–7.4)
NEUTROPHILS NFR BLD AUTO: 74.6 % — SIGNIFICANT CHANGE UP (ref 43–77)
PLATELET # BLD AUTO: 94 K/UL — LOW (ref 150–400)
POTASSIUM SERPL-MCNC: 3.9 MMOL/L — SIGNIFICANT CHANGE UP (ref 3.5–5.3)
POTASSIUM SERPL-SCNC: 3.9 MMOL/L — SIGNIFICANT CHANGE UP (ref 3.5–5.3)
RBC # BLD: 3.61 M/UL — LOW (ref 4.2–5.8)
RBC # FLD: 12.6 % — SIGNIFICANT CHANGE UP (ref 10.3–14.5)
SODIUM SERPL-SCNC: 137 MMOL/L — SIGNIFICANT CHANGE UP (ref 135–145)
WBC # BLD: 8.6 K/UL — SIGNIFICANT CHANGE UP (ref 3.8–10.5)
WBC # FLD AUTO: 8.6 K/UL — SIGNIFICANT CHANGE UP (ref 3.8–10.5)

## 2018-04-09 PROCEDURE — 99233 SBSQ HOSP IP/OBS HIGH 50: CPT

## 2018-04-09 PROCEDURE — 99232 SBSQ HOSP IP/OBS MODERATE 35: CPT | Mod: GC

## 2018-04-09 PROCEDURE — 71045 X-RAY EXAM CHEST 1 VIEW: CPT | Mod: 26

## 2018-04-09 PROCEDURE — 71045 X-RAY EXAM CHEST 1 VIEW: CPT | Mod: 26,77

## 2018-04-09 RX ORDER — INSULIN LISPRO 100/ML
7 VIAL (ML) SUBCUTANEOUS
Qty: 0 | Refills: 0 | Status: DISCONTINUED | OUTPATIENT
Start: 2018-04-09 | End: 2018-04-10

## 2018-04-09 RX ORDER — METOPROLOL TARTRATE 50 MG
12.5 TABLET ORAL
Qty: 0 | Refills: 0 | Status: DISCONTINUED | OUTPATIENT
Start: 2018-04-09 | End: 2018-04-10

## 2018-04-09 RX ADMIN — Medication 100 MILLIGRAM(S): at 22:50

## 2018-04-09 RX ADMIN — INSULIN GLARGINE 24 UNIT(S): 100 INJECTION, SOLUTION SUBCUTANEOUS at 11:56

## 2018-04-09 RX ADMIN — FAMOTIDINE 20 MILLIGRAM(S): 10 INJECTION INTRAVENOUS at 05:28

## 2018-04-09 RX ADMIN — Medication 5 UNIT(S): at 11:57

## 2018-04-09 RX ADMIN — Medication 3: at 11:57

## 2018-04-09 RX ADMIN — HEPARIN SODIUM 5000 UNIT(S): 5000 INJECTION INTRAVENOUS; SUBCUTANEOUS at 13:01

## 2018-04-09 RX ADMIN — Medication 12.5 MILLIGRAM(S): at 17:27

## 2018-04-09 RX ADMIN — Medication 5 UNIT(S): at 16:43

## 2018-04-09 RX ADMIN — Medication 5 UNIT(S): at 08:38

## 2018-04-09 RX ADMIN — HEPARIN SODIUM 5000 UNIT(S): 5000 INJECTION INTRAVENOUS; SUBCUTANEOUS at 05:28

## 2018-04-09 RX ADMIN — Medication 2: at 16:43

## 2018-04-09 RX ADMIN — ATORVASTATIN CALCIUM 20 MILLIGRAM(S): 80 TABLET, FILM COATED ORAL at 22:50

## 2018-04-09 RX ADMIN — FAMOTIDINE 20 MILLIGRAM(S): 10 INJECTION INTRAVENOUS at 17:29

## 2018-04-09 RX ADMIN — Medication 1: at 08:38

## 2018-04-09 RX ADMIN — Medication 81 MILLIGRAM(S): at 11:57

## 2018-04-09 RX ADMIN — Medication 100 MILLIGRAM(S): at 05:28

## 2018-04-09 RX ADMIN — HEPARIN SODIUM 5000 UNIT(S): 5000 INJECTION INTRAVENOUS; SUBCUTANEOUS at 22:42

## 2018-04-09 NOTE — PROGRESS NOTE ADULT - PROBLEM SELECTOR PLAN 1
-Can give NPH 10U on morning of 4/10 to transition to Lantus 24 U HS  -Increase Humalog to 7U before meals  -Goal glucose 100-180  -Patient will f/u with endocrinologist after discharge -Can give NPH 10U on morning of 4/10 to transition to Lantus 24 U HS  -Increase Humalog to 7U before meals  -Goal glucose 100-180  -Patient can be discharged on metformin  mg to be increased to 500mg ER BID in 1 week then 1000 mg ER BID.  -Discontinue glimepiride 4mg on discharge. -Can give NPH 10U on morning of 4/10 to transition to Lantus 24 U HS  -Increase Humalog to 7U before meals  -Goal glucose 100-180  -Patient can be discharged on metformin  mg to be increased to 500mg ER BID in 1 week then 1000 mg ER BID.  -Discontinue glimepiride 4mg on discharge.  -Spoke to Jass ADAMS) -Can give NPH 10U on morning of 4/10 to transition to Lantus 24 U HS  -Increase Humalog to 7U before meals  -Goal glucose 100-180  -Patient can be discharged on metformin  mg po bid x 1week, then 500mg po qam and 1000mg po qpm x 1 week and then 1000mg po bid.  -Discontinue glimepiride 4mg on discharge.  -Spoke to Jass ADAMS) -Continue Lantus 24 U HS  -Increase Humalog to 7U before meals  -Goal glucose 100-180  -Patient can be discharged on metformin  mg po bid x 1week, then 500mg po qam and 1000mg po qpm x 1 week and then 1000mg po bid.  -Discontinue glimepiride 4mg on discharge.  -Spoke to Jass ADAMS)

## 2018-04-09 NOTE — PROGRESS NOTE ADULT - SUBJECTIVE AND OBJECTIVE BOX
Chief Complaint: T2DM    History: Patient is s/p CABG. Appetite is good. Glucose levels have been in 100-200s.  María Elena ([id number])    MEDICATIONS  (STANDING):  aspirin enteric coated 81 milliGRAM(s) Oral daily  atorvastatin 20 milliGRAM(s) Oral at bedtime  docusate sodium 100 milliGRAM(s) Oral three times a day  famotidine    Tablet 20 milliGRAM(s) Oral two times a day  heparin  Injectable 5000 Unit(s) SubCutaneous every 8 hours  insulin glargine Injectable (LANTUS) 24 Unit(s) SubCutaneous <User Schedule>  insulin Infusion 4 Unit(s)/Hr (4 mL/Hr) IV Continuous <Continuous>  insulin lispro (HumaLOG) corrective regimen sliding scale   SubCutaneous Before meals and at bedtime  insulin lispro Injectable (HumaLOG) 5 Unit(s) SubCutaneous three times a day before meals  sodium chloride 0.45%. 1000 milliLiter(s) (10 mL/Hr) IV Continuous <Continuous>          Review of Systems:  Constitutional: No fever  Eyes: No blurry vision  Cardiovascular: No chest pain, palpitations  Respiratory: No SOB, no cough  Endocrine: no polyuria, polydipsia      PHYSICAL EXAM:  VITALS: T(C): 36.8 (04-09-18 @ 11:33)  T(F): 98.3 (04-09-18 @ 11:33), Max: 98.7 (04-09-18 @ 03:05)  HR: 100 (04-09-18 @ 11:33) (86 - 100)  BP: 116/62 (04-09-18 @ 11:33) (103/66 - 119/68)  RR:  (17 - 18)  SpO2:  (94% - 100%)  Wt(kg): --  GENERAL: NAD, well-groomed, well-developed  EYES: No proptosis, no lid lag, anicteric  HEENT:  Atraumatic, Normocephalic, moist mucous membranes  RESPIRATORY: Clear to auscultation bilaterally; No rales, rhonchi, wheezing, or rubs  CARDIOVASCULAR: Regular rate and rhythm  SKIN: +sternal surgical excision clean and dry  PSYCH: Alert and oriented x 3      POCT Blood Glucose.: 291 mg/dL (04-09-18 @ 11:38)  POCT Blood Glucose.: 163 mg/dL (04-09-18 @ 07:55)  POCT Blood Glucose.: 215 mg/dL (04-08-18 @ 21:27)  POCT Blood Glucose.: 179 mg/dL (04-08-18 @ 16:32)  POCT Blood Glucose.: 157 mg/dL (04-08-18 @ 13:47)  POCT Blood Glucose.: 243 mg/dL (04-08-18 @ 12:46)  POCT Blood Glucose.: 214 mg/dL (04-08-18 @ 11:10)  POCT Blood Glucose.: 220 mg/dL (04-08-18 @ 10:08)  POCT Blood Glucose.: 247 mg/dL (04-08-18 @ 09:05)  POCT Blood Glucose.: 137 mg/dL (04-08-18 @ 07:57)  POCT Blood Glucose.: 112 mg/dL (04-08-18 @ 06:07)  POCT Blood Glucose.: 102 mg/dL (04-08-18 @ 05:05)  POCT Blood Glucose.: 109 mg/dL (04-08-18 @ 04:09)  POCT Blood Glucose.: 145 mg/dL (04-08-18 @ 03:01)  POCT Blood Glucose.: 137 mg/dL (04-08-18 @ 02:00)  POCT Blood Glucose.: 116 mg/dL (04-08-18 @ 01:01)  POCT Blood Glucose.: 70 mg/dL (04-08-18 @ 00:35)  POCT Blood Glucose.: 85 mg/dL (04-08-18 @ 00:04)  POCT Blood Glucose.: 112 mg/dL (04-07-18 @ 23:11)  POCT Blood Glucose.: 113 mg/dL (04-07-18 @ 22:08)  POCT Blood Glucose.: 120 mg/dL (04-07-18 @ 21:04)  POCT Blood Glucose.: 124 mg/dL (04-07-18 @ 19:39)  POCT Blood Glucose.: 159 mg/dL (04-07-18 @ 18:32)  POCT Blood Glucose.: 182 mg/dL (04-07-18 @ 17:29)  POCT Blood Glucose.: 190 mg/dL (04-07-18 @ 16:36)  POCT Blood Glucose.: 220 mg/dL (04-07-18 @ 15:34)  POCT Blood Glucose.: 163 mg/dL (04-07-18 @ 14:35)  POCT Blood Glucose.: 221 mg/dL (04-07-18 @ 13:35)  POCT Blood Glucose.: 296 mg/dL (04-07-18 @ 12:06)  POCT Blood Glucose.: 253 mg/dL (04-07-18 @ 11:44)  POCT Blood Glucose.: 214 mg/dL (04-07-18 @ 08:02)  POCT Blood Glucose.: 141 mg/dL (04-07-18 @ 02:33)  POCT Blood Glucose.: 73 mg/dL (04-07-18 @ 01:29)  POCT Blood Glucose.: 86 mg/dL (04-07-18 @ 01:00)  POCT Blood Glucose.: 121 mg/dL (04-07-18 @ 00:07)  POCT Blood Glucose.: 112 mg/dL (04-06-18 @ 23:13)  POCT Blood Glucose.: 196 mg/dL (04-06-18 @ 22:06)  POCT Blood Glucose.: 246 mg/dL (04-06-18 @ 21:02)  POCT Blood Glucose.: 317 mg/dL (04-06-18 @ 20:03)  POCT Blood Glucose.: 303 mg/dL (04-06-18 @ 17:37)      04-09    137  |  100  |  21  ----------------------------<  159<H>  3.9   |  26  |  0.92    EGFR if : 93  EGFR if non : 80    Ca    9.0      04-09                Hemoglobin A1C, Whole Blood: 7.5 % <H> [4.0 - 5.6] (04-02-18 @ 15:27)

## 2018-04-09 NOTE — PROGRESS NOTE ADULT - PROBLEM SELECTOR PROBLEM 1
S/P CABG x 3
S/P CABG x 3
Type 2 diabetes mellitus with other circulatory complication, without long-term current use of insulin
S/P CABG x 3
S/P CABG x 3

## 2018-04-09 NOTE — PROGRESS NOTE ADULT - SUBJECTIVE AND OBJECTIVE BOX
VITAL SIGNS    Telemetry:  NSR 80-90    Vital Signs Last 24 Hrs  T(C): 36.8 (04-09-18 @ 07:49), Max: 37.1 (04-08-18 @ 11:40)  T(F): 98.3 (04-09-18 @ 07:49), Max: 98.7 (04-08-18 @ 11:40)  HR: 88 (04-09-18 @ 07:49) (86 - 91)  BP: 106/63 (04-09-18 @ 07:49) (103/66 - 124/62)  RR: 18 (04-09-18 @ 07:49) (17 - 18)  SpO2: 96% (04-09-18 @ 07:49) (94% - 97%)                   04-08 @ 07:01  -  04-09 @ 07:00  --------------------------------------------------------  IN: 960 mL / OUT: 1555 mL / NET: -595 mL    04-09 @ 07:01  -  04-09 @ 10:59  --------------------------------------------------------  IN: 360 mL / OUT: 0 mL / NET: 360 mL          Daily     Daily         CAPILLARY BLOOD GLUCOSE      POCT Blood Glucose.: 163 mg/dL (09 Apr 2018 07:55)  POCT Blood Glucose.: 215 mg/dL (08 Apr 2018 21:27)  POCT Blood Glucose.: 179 mg/dL (08 Apr 2018 16:32)  POCT Blood Glucose.: 157 mg/dL (08 Apr 2018 13:47)  POCT Blood Glucose.: 243 mg/dL (08 Apr 2018 12:46)  POCT Blood Glucose.: 214 mg/dL (08 Apr 2018 11:10)                Coumadin    [ ] YES          [  X]      NO                                   PHYSICAL EXAM        Neurology: alert and oriented x 3, nonfocal, no gross deficits  CV : .S1S2 RRR  Sternal Wound :  CDI , Stable  Pacing Wires        [  ]   Settings:                                  Isolated  [ X ]  Lungs: bibasilar crackles   Drains:     MS         [  ] Drainage:                 L Pleural  [  X]  Drainage:     50/250         R Pleural  [  ]  Drainage:  Abdomen: soft, nontender, nondistended, positive bowel sounds, last bowel movement PREOP  :         voiding    Extremities:     - EDEMA - CALVE T ENDERNESS. r LEG INC CDI          aspirin enteric coated 81 milliGRAM(s) Oral daily  atorvastatin 20 milliGRAM(s) Oral at bedtime  docusate sodium 100 milliGRAM(s) Oral three times a day  famotidine    Tablet 20 milliGRAM(s) Oral two times a day  heparin  Injectable 5000 Unit(s) SubCutaneous every 8 hours  insulin glargine Injectable (LANTUS) 24 Unit(s) SubCutaneous <User Schedule>  insulin Infusion 4 Unit(s)/Hr IV Continuous <Continuous>  insulin lispro (HumaLOG) corrective regimen sliding scale   SubCutaneous Before meals and at bedtime  insulin lispro Injectable (HumaLOG) 5 Unit(s) SubCutaneous three times a day before meals  sodium chloride 0.45%. 1000 milliLiter(s) IV Continuous <Continuous>                    Physical Therapy Rec:   Home  [  ]   Home w/ PT  [  ]  Rehab  [  ]  Discussed with Cardiothoracic Team at AM rounds.

## 2018-04-09 NOTE — PROGRESS NOTE ADULT - ATTENDING COMMENTS
Patient seen and examined. Agree with note as above with addendum: patient with 3/6 HEENA over L side of chest. On discharge he can use metformin monotherapy as 500mg po qdaily is not a treatment dose. Discontinue glimepiride.   Pager: [phone]  Nights and Weekends: [phone]

## 2018-04-09 NOTE — PROGRESS NOTE ADULT - PROBLEM SELECTOR PLAN 3
Maintain right CT --> LWS   CXR in AM
to LWS  CXR while in place

## 2018-04-09 NOTE — PROGRESS NOTE ADULT - PROBLEM SELECTOR PROBLEM 2
Hyperglycemia
Hyperglycemia
Hyperlipidemia, unspecified hyperlipidemia type
Hyperglycemia
Hyperglycemia

## 2018-04-09 NOTE — PROGRESS NOTE ADULT - ASSESSMENT
77 y/o mandarin speaking M PMH CAD, some dyspnea on exertion noted, denies angina, had an abnormal echo, stress test as per daughter, found to have TVD on cardiac catheterization 3/2018.    On 4/5 s/p CABG  X 3 LIMA / EVH   Post op Course:   Pressor support required; weaned off   4/6: Transferred to stepdown,  junctional rhythm, VVI pacing at 70 implemented; No AV nodals at this time 2/2 SB   4/7: De-intensified; Insulin resumed for Hyperglycemia --> House Endo consult called; maintain insulin till 4/8 then plan to weaned off/ D/C Crawford --> TOV   4/8  hyperglycemic  Insulin gtt continued  House ENDo added lantus and premeal,  VSS   rt chest tube kept in for drainage,  NSR  4/9 Insulin infusion weaned off.  CT with 50cc overnight and 50cc this am.  Will reassess  drainage for removal  Remains off beta blocker for post op alondra/junctional  May transfer to floor

## 2018-04-09 NOTE — PROGRESS NOTE ADULT - ASSESSMENT
Mr. Castro is a 79 y/o M with HLD, DM who presented with TVD now s/p 3V CABG 4/5/2018, normal LV function.   Tolerated well. Episodes of junctional bradycardia post procedure. Beta blocker therapy has been held.  Tele is remarkable for a sinus tachycardia, he seems well perfused but is cool on exam.  - c/w ASA and statin  - Suggest BB trial of lopressor 12.5 mg po bid  - Watch on telemetry for additional conduction disease  - If remains tachycardic, I would consider repeating his echocardiogram.  - CTS care appreciated  - Watch creatinine and electrolytes.  - monitor electrolytes and volume status  - will follow along with you

## 2018-04-09 NOTE — PROGRESS NOTE ADULT - SUBJECTIVE AND OBJECTIVE BOX
[facility] - Theodore Rinaldi, Ami, Rodney, Ann, Janelle Alfred  Office Number:  [phone]    Patient resting comfortably in bed in NAD.  Laying flat with no respiratory distress.  No complaints of chest pain, dyspnea, palpitations, PND, or orthopnea.    ROS: negative unless otherwise mentioned.    Telemetry:  SR     MEDICATIONS  (STANDING):  aspirin enteric coated 81 milliGRAM(s) Oral daily  atorvastatin 20 milliGRAM(s) Oral at bedtime  docusate sodium 100 milliGRAM(s) Oral three times a day  famotidine    Tablet 20 milliGRAM(s) Oral two times a day  heparin  Injectable 5000 Unit(s) SubCutaneous every 8 hours  insulin glargine Injectable (LANTUS) 24 Unit(s) SubCutaneous <User Schedule>  insulin Infusion 4 Unit(s)/Hr (4 mL/Hr) IV Continuous <Continuous>  insulin lispro (HumaLOG) corrective regimen sliding scale   SubCutaneous Before meals and at bedtime  insulin lispro Injectable (HumaLOG) 5 Unit(s) SubCutaneous three times a day before meals  sodium chloride 0.45%. 1000 milliLiter(s) (10 mL/Hr) IV Continuous <Continuous>    MEDICATIONS  (PRN):      Allergies    No Known Allergies    Intolerances        Vital Signs Last 24 Hrs  T(C): 36.8 (09 Apr 2018 11:33), Max: 37.1 (09 Apr 2018 03:05)  T(F): 98.3 (09 Apr 2018 11:33), Max: 98.7 (09 Apr 2018 03:05)  HR: 100 (09 Apr 2018 11:33) (86 - 100)  BP: 116/62 (09 Apr 2018 11:33) (103/66 - 119/68)  BP(mean): 88 (09 Apr 2018 03:05) (82 - 88)  RR: 18 (09 Apr 2018 11:33) (17 - 18)  SpO2: 100% (09 Apr 2018 11:33) (94% - 100%)    I&O's Summary    08 Apr 2018 07:01  -  09 Apr 2018 07:00  --------------------------------------------------------  IN: 960 mL / OUT: 1555 mL / NET: -595 mL    09 Apr 2018 07:01  -  09 Apr 2018 13:19  --------------------------------------------------------  IN: 720 mL / OUT: 350 mL / NET: 370 mL        ON EXAM:    · Constitutional	Well-developed, well nourished, comfortable in chair.  · Eyes	EOMI; PERRL; no drainage or redness  · ENMT	No oral lesions; no gross abnormalities  · Neck	No bruits; no thyromegaly or nodules  · Respiratory	Decreased BS at bases bilaterally, no crackles. CT in place.  · Cardiovascular	Regular rate & rhythm, normal S1, S2; no murmurs, gallops or rubs; no S3, S4  · Gastrointestinal	Soft, non-tender, no hepatosplenomegaly, normal bowel sounds  · Extremities	No cyanosis, clubbing or edema  · Vascular	Equal and normal pulses (carotid, femoral, dorsalis pedis)  · Neurological	Alert & oriented; no sensory, motor or coordination deficits, normal reflexes    LABS: All Labs Reviewed:                        11.9   8.6   )-----------( 94       ( 09 Apr 2018 06:26 )             34.2                         11.3   15.4  )-----------( 90       ( 08 Apr 2018 05:55 )             33.1                         10.0   14.9  )-----------( 75       ( 07 Apr 2018 05:17 )             28.7     09 Apr 2018 06:26    137    |  100    |  21     ----------------------------<  159    3.9     |  26     |  0.92   08 Apr 2018 05:55    140    |  99     |  24     ----------------------------<  98     3.7     |  28     |  1.04   07 Apr 2018 05:17    140    |  107    |  31     ----------------------------<  213    5.3     |  24     |  1.20     Ca    9.0        09 Apr 2018 06:26  Ca    8.9        08 Apr 2018 05:55  Ca    8.6        07 Apr 2018 05:17            Blood Culture:

## 2018-04-09 NOTE — PROGRESS NOTE ADULT - ASSESSMENT
79 yo man with Type 2 diabetes admitted for CABG for triple vessel disease.  Course complicated by hyperglycemia requiring insulin drip. Patient now on basal/bolus

## 2018-04-10 ENCOUNTER — TRANSCRIPTION ENCOUNTER (OUTPATIENT)
Age: 78
End: 2018-04-10

## 2018-04-10 VITALS
OXYGEN SATURATION: 97 % | HEART RATE: 90 BPM | RESPIRATION RATE: 18 BRPM | TEMPERATURE: 99 F | SYSTOLIC BLOOD PRESSURE: 127 MMHG | DIASTOLIC BLOOD PRESSURE: 70 MMHG

## 2018-04-10 PROBLEM — E11.9 TYPE 2 DIABETES MELLITUS WITHOUT COMPLICATIONS: Chronic | Status: ACTIVE | Noted: 2018-03-30

## 2018-04-10 PROBLEM — E78.5 HYPERLIPIDEMIA, UNSPECIFIED: Chronic | Status: ACTIVE | Noted: 2018-03-30

## 2018-04-10 LAB
ANION GAP SERPL CALC-SCNC: 10 MMOL/L — SIGNIFICANT CHANGE UP (ref 5–17)
BUN SERPL-MCNC: 22 MG/DL — SIGNIFICANT CHANGE UP (ref 7–23)
CALCIUM SERPL-MCNC: 9.1 MG/DL — SIGNIFICANT CHANGE UP (ref 8.4–10.5)
CHLORIDE SERPL-SCNC: 101 MMOL/L — SIGNIFICANT CHANGE UP (ref 96–108)
CO2 SERPL-SCNC: 28 MMOL/L — SIGNIFICANT CHANGE UP (ref 22–31)
CREAT SERPL-MCNC: 1.06 MG/DL — SIGNIFICANT CHANGE UP (ref 0.5–1.3)
GLUCOSE BLDC GLUCOMTR-MCNC: 157 MG/DL — HIGH (ref 70–99)
GLUCOSE BLDC GLUCOMTR-MCNC: 195 MG/DL — HIGH (ref 70–99)
GLUCOSE SERPL-MCNC: 130 MG/DL — HIGH (ref 70–99)
HCT VFR BLD CALC: 34.2 % — LOW (ref 39–50)
HGB BLD-MCNC: 11.8 G/DL — LOW (ref 13–17)
MCHC RBC-ENTMCNC: 32.9 PG — SIGNIFICANT CHANGE UP (ref 27–34)
MCHC RBC-ENTMCNC: 34.6 GM/DL — SIGNIFICANT CHANGE UP (ref 32–36)
MCV RBC AUTO: 95.1 FL — SIGNIFICANT CHANGE UP (ref 80–100)
PLATELET # BLD AUTO: 125 K/UL — LOW (ref 150–400)
POTASSIUM SERPL-MCNC: 4.3 MMOL/L — SIGNIFICANT CHANGE UP (ref 3.5–5.3)
POTASSIUM SERPL-SCNC: 4.3 MMOL/L — SIGNIFICANT CHANGE UP (ref 3.5–5.3)
RBC # BLD: 3.59 M/UL — LOW (ref 4.2–5.8)
RBC # FLD: 12.5 % — SIGNIFICANT CHANGE UP (ref 10.3–14.5)
SODIUM SERPL-SCNC: 139 MMOL/L — SIGNIFICANT CHANGE UP (ref 135–145)
WBC # BLD: 9.8 K/UL — SIGNIFICANT CHANGE UP (ref 3.8–10.5)
WBC # FLD AUTO: 9.8 K/UL — SIGNIFICANT CHANGE UP (ref 3.8–10.5)

## 2018-04-10 PROCEDURE — 85610 PROTHROMBIN TIME: CPT

## 2018-04-10 PROCEDURE — 85027 COMPLETE CBC AUTOMATED: CPT

## 2018-04-10 PROCEDURE — 99232 SBSQ HOSP IP/OBS MODERATE 35: CPT

## 2018-04-10 PROCEDURE — P9016: CPT

## 2018-04-10 PROCEDURE — 93005 ELECTROCARDIOGRAM TRACING: CPT

## 2018-04-10 PROCEDURE — P9041: CPT

## 2018-04-10 PROCEDURE — P9045: CPT

## 2018-04-10 PROCEDURE — 87640 STAPH A DNA AMP PROBE: CPT

## 2018-04-10 PROCEDURE — P9012: CPT

## 2018-04-10 PROCEDURE — 82330 ASSAY OF CALCIUM: CPT

## 2018-04-10 PROCEDURE — 86923 COMPATIBILITY TEST ELECTRIC: CPT

## 2018-04-10 PROCEDURE — 86901 BLOOD TYPING SEROLOGIC RH(D): CPT

## 2018-04-10 PROCEDURE — P9047: CPT

## 2018-04-10 PROCEDURE — C1889: CPT

## 2018-04-10 PROCEDURE — 82565 ASSAY OF CREATININE: CPT

## 2018-04-10 PROCEDURE — 86900 BLOOD TYPING SEROLOGIC ABO: CPT

## 2018-04-10 PROCEDURE — 82435 ASSAY OF BLOOD CHLORIDE: CPT

## 2018-04-10 PROCEDURE — 97162 PT EVAL MOD COMPLEX 30 MIN: CPT

## 2018-04-10 PROCEDURE — G0463: CPT

## 2018-04-10 PROCEDURE — 97116 GAIT TRAINING THERAPY: CPT

## 2018-04-10 PROCEDURE — P9037: CPT

## 2018-04-10 PROCEDURE — 82550 ASSAY OF CK (CPK): CPT

## 2018-04-10 PROCEDURE — C1769: CPT

## 2018-04-10 PROCEDURE — 87641 MR-STAPH DNA AMP PROBE: CPT

## 2018-04-10 PROCEDURE — 82947 ASSAY GLUCOSE BLOOD QUANT: CPT

## 2018-04-10 PROCEDURE — 31720 CLEARANCE OF AIRWAYS: CPT

## 2018-04-10 PROCEDURE — 71045 X-RAY EXAM CHEST 1 VIEW: CPT

## 2018-04-10 PROCEDURE — 86891 AUTOLOGOUS BLOOD OP SALVAGE: CPT

## 2018-04-10 PROCEDURE — 84295 ASSAY OF SERUM SODIUM: CPT

## 2018-04-10 PROCEDURE — 84132 ASSAY OF SERUM POTASSIUM: CPT

## 2018-04-10 PROCEDURE — 83036 HEMOGLOBIN GLYCOSYLATED A1C: CPT

## 2018-04-10 PROCEDURE — 84484 ASSAY OF TROPONIN QUANT: CPT

## 2018-04-10 PROCEDURE — 82962 GLUCOSE BLOOD TEST: CPT

## 2018-04-10 PROCEDURE — 80048 BASIC METABOLIC PNL TOTAL CA: CPT

## 2018-04-10 PROCEDURE — 97530 THERAPEUTIC ACTIVITIES: CPT

## 2018-04-10 PROCEDURE — 83605 ASSAY OF LACTIC ACID: CPT

## 2018-04-10 PROCEDURE — 86850 RBC ANTIBODY SCREEN: CPT

## 2018-04-10 PROCEDURE — 85730 THROMBOPLASTIN TIME PARTIAL: CPT

## 2018-04-10 PROCEDURE — 36430 TRANSFUSION BLD/BLD COMPNT: CPT

## 2018-04-10 PROCEDURE — 94002 VENT MGMT INPAT INIT DAY: CPT

## 2018-04-10 PROCEDURE — 80053 COMPREHEN METABOLIC PANEL: CPT

## 2018-04-10 PROCEDURE — 82553 CREATINE MB FRACTION: CPT

## 2018-04-10 PROCEDURE — 85014 HEMATOCRIT: CPT

## 2018-04-10 PROCEDURE — 82803 BLOOD GASES ANY COMBINATION: CPT

## 2018-04-10 PROCEDURE — C1751: CPT

## 2018-04-10 RX ORDER — ACETAMINOPHEN 500 MG
650 TABLET ORAL EVERY 6 HOURS
Qty: 0 | Refills: 0 | Status: DISCONTINUED | OUTPATIENT
Start: 2018-04-10 | End: 2018-04-10

## 2018-04-10 RX ORDER — METFORMIN HYDROCHLORIDE 850 MG/1
1 TABLET ORAL
Qty: 120 | Refills: 0 | OUTPATIENT
Start: 2018-04-10 | End: 2018-06-08

## 2018-04-10 RX ORDER — METFORMIN HYDROCHLORIDE 850 MG/1
500 TABLET ORAL
Qty: 0 | Refills: 0 | Status: DISCONTINUED | OUTPATIENT
Start: 2018-04-10 | End: 2018-04-10

## 2018-04-10 RX ORDER — GLIMEPIRIDE 1 MG
1 TABLET ORAL
Qty: 0 | Refills: 0 | COMMUNITY

## 2018-04-10 RX ORDER — METOPROLOL TARTRATE 50 MG
0.5 TABLET ORAL
Qty: 30 | Refills: 0 | OUTPATIENT
Start: 2018-04-10 | End: 2018-05-09

## 2018-04-10 RX ORDER — METFORMIN HYDROCHLORIDE 850 MG/1
1 TABLET ORAL
Qty: 0 | Refills: 0 | COMMUNITY

## 2018-04-10 RX ORDER — ACETAMINOPHEN 500 MG
2 TABLET ORAL
Qty: 0 | Refills: 0 | COMMUNITY
Start: 2018-04-10

## 2018-04-10 RX ADMIN — Medication 100 MILLIGRAM(S): at 06:21

## 2018-04-10 RX ADMIN — Medication 1: at 11:54

## 2018-04-10 RX ADMIN — HEPARIN SODIUM 5000 UNIT(S): 5000 INJECTION INTRAVENOUS; SUBCUTANEOUS at 14:28

## 2018-04-10 RX ADMIN — Medication 81 MILLIGRAM(S): at 11:23

## 2018-04-10 RX ADMIN — Medication 12.5 MILLIGRAM(S): at 06:21

## 2018-04-10 RX ADMIN — Medication 100 MILLIGRAM(S): at 14:28

## 2018-04-10 RX ADMIN — HEPARIN SODIUM 5000 UNIT(S): 5000 INJECTION INTRAVENOUS; SUBCUTANEOUS at 06:29

## 2018-04-10 RX ADMIN — FAMOTIDINE 20 MILLIGRAM(S): 10 INJECTION INTRAVENOUS at 06:21

## 2018-04-10 RX ADMIN — Medication 10 MILLIGRAM(S): at 12:00

## 2018-04-10 RX ADMIN — Medication 650 MILLIGRAM(S): at 12:30

## 2018-04-10 RX ADMIN — Medication 1: at 07:42

## 2018-04-10 RX ADMIN — Medication 650 MILLIGRAM(S): at 11:25

## 2018-04-10 RX ADMIN — Medication 7 UNIT(S): at 07:43

## 2018-04-10 NOTE — DISCHARGE NOTE ADULT - CARE PROVIDER_API CALL
Rocael Enriquez), Surgery; Thoracic and Cardiac Surgery  [address] [place] [address]  Phone: [phone]  Fax: [phone]

## 2018-04-10 NOTE — DISCHARGE NOTE ADULT - PATIENT PORTAL LINK FT
You can access the [email or web address] Portal, offered by [facility], by registering with the following website: [email or web address]

## 2018-04-10 NOTE — DISCHARGE NOTE ADULT - HOME CARE AGENCY
[facility] [phone] referral has been made for visiting nurse, physical therapy and aide assessment

## 2018-04-10 NOTE — DISCHARGE NOTE ADULT - PLAN OF CARE
recovery shower daily   all incisions wet ck  your blood sugar 4x day maintain blood sugar under 170

## 2018-04-10 NOTE — PROGRESS NOTE ADULT - ASSESSMENT
Mr. Castro is a 77 y/o M with HLD, DM who presented with TVD now s/p 3V CABG 4/5/2018, normal LV function.   Tolerated well. Episodes of junctional bradycardia post procedure have resolved. Now tolerating low dose bb  - c/w ASA and statin  - continue metoprolol at current dose  - CTS care appreciated  - Watch creatinine and electrolytes.  - monitor electrolytes and volume status  - will follow along with you

## 2018-04-10 NOTE — PROGRESS NOTE ADULT - SUBJECTIVE AND OBJECTIVE BOX
[facility] - Theodore Rinaldi, Ami, Rodney, Ann, Janelle Alfred  Office Number:  [phone]    Patient resting comfortably in bed in NAD.  Laying flat with no respiratory distress.  No complaints of chest pain, dyspnea, palpitations, PND, or orthopnea.    Telemetry:  Sinus rhythm.  PVCs.    MEDICATIONS  (STANDING):  aspirin enteric coated 81 milliGRAM(s) Oral daily  atorvastatin 20 milliGRAM(s) Oral at bedtime  docusate sodium 100 milliGRAM(s) Oral three times a day  famotidine    Tablet 20 milliGRAM(s) Oral two times a day  heparin  Injectable 5000 Unit(s) SubCutaneous every 8 hours  insulin lispro (HumaLOG) corrective regimen sliding scale   SubCutaneous Before meals and at bedtime  metFORMIN 500 milliGRAM(s) Oral two times a day  metoprolol tartrate 12.5 milliGRAM(s) Oral two times a day  sodium chloride 0.45%. 1000 milliLiter(s) (10 mL/Hr) IV Continuous <Continuous>      Allergies    No Known Allergies        Vital Signs Last 24 Hrs  T(C): 37.2 (10 Apr 2018 04:51), Max: 37.2 (10 Apr 2018 04:51)  T(F): 98.9 (10 Apr 2018 04:51), Max: 98.9 (10 Apr 2018 04:51)  HR: 94 (10 Apr 2018 04:51) (85 - 100)  BP: 123/73 (10 Apr 2018 04:51) (103/64 - 123/73)  BP(mean): --  RR: 17 (10 Apr 2018 04:51) (17 - 18)  SpO2: 94% (10 Apr 2018 04:51) (94% - 100%)    I&O's Summary    09 Apr 2018 07:01  -  10 Apr 2018 07:00  --------------------------------------------------------  IN: 1200 mL / OUT: 620 mL / NET: 580 mL        ON EXAM:    General: NAD, awake and alert, oriented x 3  HEENT: Mucous membranes are moist, anicteric  Lungs: Non-labored, breath sounds are clear bilaterally, No wheezing, rales or rhonchi.  Decreased breath sounds at the bases b/l  Cardiovascular: Regular, S1 and S2, no murmurs, rubs, or gallops  Gastrointestinal: Bowel Sounds present, soft, nontender.   Lymph: No peripheral edema. No lymphadenopathy.  Skin: No rashes or ulcers  Psych:  Mood & affect appropriate    LABS: All Labs Reviewed:                        11.8   9.8   )-----------( 125      ( 10 Apr 2018 05:29 )             34.2                         11.9   8.6   )-----------( 94       ( 09 Apr 2018 06:26 )             34.2                         11.3   15.4  )-----------( 90       ( 08 Apr 2018 05:55 )             33.1     10 Apr 2018 05:29    139    |  101    |  22     ----------------------------<  130    4.3     |  28     |  1.06   09 Apr 2018 06:26    137    |  100    |  21     ----------------------------<  159    3.9     |  26     |  0.92   08 Apr 2018 05:55    140    |  99     |  24     ----------------------------<  98     3.7     |  28     |  1.04     Ca    9.1        10 Apr 2018 05:29  Ca    9.0        09 Apr 2018 06:26  Ca    8.9        08 Apr 2018 05:55

## 2018-04-10 NOTE — DISCHARGE NOTE ADULT - HOSPITAL COURSE
· Assessment		  77 y/o mandarin speaking M PMH CAD, some dyspnea on exertion noted, denies angina, had an abnormal echo, stress test as per daughter, found to have TVD on cardiac catheterization 3/2018.    On 4/5 s/p CABG  X 3 LIMA / EVH   Post op Course:   Pressor support required; weaned off   4/6: Transferred to stepdown,  junctional rhythm, VVI pacing at 70 implemented; No AV nodals at this time 2/2 SB   4/7: De-intensified; Insulin resumed for Hyperglycemia --> House Endo consult called; maintain insulin till 4/8 then plan to weaned off/ D/C Crawford --> TOV   4/8  hyperglycemic  Insulin gtt continued  House ENDo added lantus and premeal,  VSS   rt chest tube kept in for drainage,  NSR  4/9 Insulin infusion weaned off.  CT with 50cc overnight and 50cc this am.  Will reassess  drainage for removal  Remains off beta blocker for post op alondra/junctional  4/10     NSR  80-90   VSS   home today

## 2018-04-10 NOTE — DISCHARGE NOTE ADULT - MEDICATION SUMMARY - MEDICATIONS TO CHANGE
I will SWITCH the dose or number of times a day I take the medications listed below when I get home from the hospital:    metFORMIN 500 mg oral tablet, extended release  -- 1 tab(s) by mouth once a day

## 2018-04-10 NOTE — PROGRESS NOTE ADULT - PROVIDER SPECIALTY LIST ADULT
CT Surgery
Cardiology
Endocrinology
CT Surgery

## 2018-04-10 NOTE — DISCHARGE NOTE ADULT - CARE PLAN
Principal Discharge DX:	CAD (coronary artery disease)  Goal:	recovery  Assessment and plan of treatment:	shower daily   all incisions wet  Secondary Diagnosis:	Hyperglycemia  Goal:	ck  your blood sugar 4x day  Assessment and plan of treatment:	maintain blood sugar under 170

## 2018-04-10 NOTE — DISCHARGE NOTE ADULT - COMMUNITY RESOURCES
[facility]  [phone].  Prescriptions faxed to [facility] for glucometer and supplies.

## 2018-04-10 NOTE — DISCHARGE NOTE ADULT - MEDICATION SUMMARY - MEDICATIONS TO TAKE
I will START or STAY ON the medications listed below when I get home from the hospital:    Aspirin Enteric Coated 81 mg oral delayed release tablet  -- 1 tab(s) by mouth once a day  -- Indication: For Heart health    acetaminophen 325 mg oral tablet  -- 2 tab(s) by mouth every 6 hours, As needed, Moderate Pain (4 - 6)  -- Indication: For Pain    metFORMIN 500 mg oral tablet, extended release  -- 1 tab(s) by mouth 2 times a day   4/17  increasse to 2 tabs (1000mg) at night  4/24  increase to 2 tabs (1000mg) 2x day     am and pm  -- Check with your doctor before becoming pregnant.  Do not drink alcoholic beverages when taking this medication.  Obtain medical advice before taking any non-prescription drugs as some may affect the action of this medication.  Swallow whole.  Do not crush.  Take with food or milk.    -- Indication: For blood sugar,  increase dose in 1 week then 2 weeks    simvastatin 20 mg oral tablet  -- 1 tab(s) by mouth once a day (at bedtime)  -- Indication: For Cholesterol    metoprolol tartrate 25 mg oral tablet  -- 0.5 tab(s) by mouth 2 times a day    12.5 mg     -- It is very important that you take or use this exactly as directed.  Do not skip doses or discontinue unless directed by your doctor.  May cause drowsiness.  Alcohol may intensify this effect.  Use care when operating dangerous machinery.  Some non-prescription drugs may aggravate your condition.  Read all labels carefully.  If a warning appears, check with your doctor before taking.  Take with food or milk.  This drug may impair the ability to drive or operate machinery.  Use care until you become familiar with its effects.    -- Indication: For HR  (   take  12.5  mg   2x day)

## 2018-04-10 NOTE — DISCHARGE NOTE ADULT - ADDITIONAL INSTRUCTIONS
see Dr Enriquez April 25th   11 am see Dr Enriquez April 25th   11 am  see your cardiologist in 2 weeks

## 2018-04-11 ENCOUNTER — RECORD ABSTRACTING (OUTPATIENT)
Age: 78
End: 2018-04-11

## 2018-04-12 ENCOUNTER — RECORD ABSTRACTING (OUTPATIENT)
Age: 78
End: 2018-04-12

## 2018-04-16 ENCOUNTER — RECORD ABSTRACTING (OUTPATIENT)
Age: 78
End: 2018-04-16

## 2018-04-16 ENCOUNTER — OTHER (OUTPATIENT)
Age: 78
End: 2018-04-16

## 2018-04-16 DIAGNOSIS — I50.33 ACUTE ON CHRONIC DIASTOLIC (CONGESTIVE) HEART FAILURE: ICD-10-CM

## 2018-04-16 DIAGNOSIS — I49.8 OTHER SPECIFIED CARDIAC ARRHYTHMIAS: ICD-10-CM

## 2018-04-16 RX ORDER — ACETAMINOPHEN 325 MG/1
325 TABLET, FILM COATED ORAL EVERY 6 HOURS
Refills: 0 | Status: ACTIVE | COMMUNITY

## 2018-04-16 RX ORDER — ASPIRIN 81 MG/1
81 TABLET ORAL DAILY
Qty: 30 | Refills: 0 | Status: ACTIVE | COMMUNITY

## 2018-04-16 RX ORDER — GLIMEPIRIDE 4 MG/1
4 TABLET ORAL DAILY
Refills: 0 | Status: DISCONTINUED | COMMUNITY
End: 2018-04-16

## 2018-04-16 RX ORDER — METFORMIN HYDROCHLORIDE 1000 MG/1
1000 TABLET, COATED ORAL
Refills: 0 | Status: ACTIVE | COMMUNITY

## 2018-04-25 ENCOUNTER — APPOINTMENT (OUTPATIENT)
Dept: CARDIOTHORACIC SURGERY | Facility: CLINIC | Age: 78
End: 2018-04-25
Payer: MEDICAID

## 2018-04-25 VITALS
DIASTOLIC BLOOD PRESSURE: 63 MMHG | TEMPERATURE: 97.5 F | HEART RATE: 71 BPM | OXYGEN SATURATION: 98 % | RESPIRATION RATE: 15 BRPM | SYSTOLIC BLOOD PRESSURE: 119 MMHG

## 2018-04-25 VITALS — WEIGHT: 142 LBS | HEIGHT: 70 IN | BODY MASS INDEX: 20.33 KG/M2

## 2018-04-25 DIAGNOSIS — R53.83 OTHER FATIGUE: ICD-10-CM

## 2018-04-25 DIAGNOSIS — Z09 ENCOUNTER FOR FOLLOW-UP EXAMINATION AFTER COMPLETED TREATMENT FOR CONDITIONS OTHER THAN MALIGNANT NEOPLASM: ICD-10-CM

## 2018-04-25 PROCEDURE — 99024 POSTOP FOLLOW-UP VISIT: CPT

## 2018-05-18 ENCOUNTER — MEDICATION RENEWAL (OUTPATIENT)
Age: 78
End: 2018-05-18

## 2018-05-18 RX ORDER — SIMVASTATIN 20 MG/1
20 TABLET, FILM COATED ORAL
Qty: 30 | Refills: 0 | Status: DISCONTINUED | COMMUNITY
End: 2018-05-18

## 2018-05-24 ENCOUNTER — NON-APPOINTMENT (OUTPATIENT)
Age: 78
End: 2018-05-24

## 2018-05-24 ENCOUNTER — APPOINTMENT (OUTPATIENT)
Dept: CARDIOLOGY | Facility: CLINIC | Age: 78
End: 2018-05-24
Payer: MEDICAID

## 2018-05-24 VITALS
SYSTOLIC BLOOD PRESSURE: 129 MMHG | OXYGEN SATURATION: 98 % | HEIGHT: 70 IN | HEART RATE: 59 BPM | BODY MASS INDEX: 20.9 KG/M2 | WEIGHT: 146 LBS | DIASTOLIC BLOOD PRESSURE: 74 MMHG

## 2018-05-24 DIAGNOSIS — Z95.1 PRESENCE OF AORTOCORONARY BYPASS GRAFT: ICD-10-CM

## 2018-05-24 DIAGNOSIS — E78.5 HYPERLIPIDEMIA, UNSPECIFIED: ICD-10-CM

## 2018-05-24 DIAGNOSIS — R06.09 OTHER FORMS OF DYSPNEA: ICD-10-CM

## 2018-05-24 PROCEDURE — 93000 ELECTROCARDIOGRAM COMPLETE: CPT

## 2018-05-24 PROCEDURE — 99214 OFFICE O/P EST MOD 30 MIN: CPT

## 2018-07-10 ENCOUNTER — TRANSCRIPTION ENCOUNTER (OUTPATIENT)
Age: 78
End: 2018-07-10

## 2018-07-11 ENCOUNTER — APPOINTMENT (OUTPATIENT)
Dept: CARDIOLOGY | Facility: CLINIC | Age: 78
End: 2018-07-11
Payer: MEDICAID

## 2018-07-11 PROCEDURE — 93306 TTE W/DOPPLER COMPLETE: CPT

## 2018-07-16 ENCOUNTER — APPOINTMENT (OUTPATIENT)
Dept: CARDIOLOGY | Facility: CLINIC | Age: 78
End: 2018-07-16
Payer: MEDICAID

## 2018-07-16 ENCOUNTER — NON-APPOINTMENT (OUTPATIENT)
Age: 78
End: 2018-07-16

## 2018-07-16 VITALS
HEART RATE: 59 BPM | BODY MASS INDEX: 21.19 KG/M2 | DIASTOLIC BLOOD PRESSURE: 75 MMHG | HEIGHT: 70 IN | WEIGHT: 148 LBS | OXYGEN SATURATION: 100 % | SYSTOLIC BLOOD PRESSURE: 122 MMHG

## 2018-07-16 DIAGNOSIS — I25.10 ATHEROSCLEROTIC HEART DISEASE OF NATIVE CORONARY ARTERY W/OUT ANGINA PECTORIS: ICD-10-CM

## 2018-07-16 PROCEDURE — 93000 ELECTROCARDIOGRAM COMPLETE: CPT

## 2018-07-16 PROCEDURE — 99214 OFFICE O/P EST MOD 30 MIN: CPT

## 2018-07-16 RX ORDER — FUROSEMIDE 20 MG/1
20 TABLET ORAL DAILY
Qty: 30 | Refills: 3 | Status: DISCONTINUED | COMMUNITY
Start: 2018-05-24 | End: 2018-07-16

## 2018-07-16 RX ORDER — ATORVASTATIN CALCIUM 40 MG/1
40 TABLET, FILM COATED ORAL
Qty: 90 | Refills: 1 | Status: ACTIVE | COMMUNITY
Start: 2018-05-18 | End: 1900-01-01

## 2018-07-17 RX ORDER — METOPROLOL TARTRATE 25 MG/1
25 TABLET, FILM COATED ORAL
Qty: 90 | Refills: 3 | Status: ACTIVE | COMMUNITY

## 2018-10-31 ENCOUNTER — MEDICATION RENEWAL (OUTPATIENT)
Age: 78
End: 2018-10-31

## 2018-10-31 RX ORDER — LISINOPRIL 2.5 MG/1
2.5 TABLET ORAL DAILY
Qty: 90 | Refills: 3 | Status: ACTIVE | COMMUNITY
Start: 2018-07-16

## 2019-01-22 NOTE — PROGRESS NOTE ADULT - PROBLEM SELECTOR PLAN 1
Pt needs an appointment for more refills   No AV nodals   for Sinus bradyLipitor 20 mg PO daily   Restart Insulin gtt; Endo consult called and appreciated  Maintain Right pleural CT --> LWS  D/C Plan home PT

## 2020-12-10 ENCOUNTER — TRANSCRIPTION ENCOUNTER (OUTPATIENT)
Age: 80
End: 2020-12-10

## 2023-02-17 NOTE — CONSULT NOTE ADULT - CONSULT REASON
Dragon dictation.   
Type 2 diabetes
CAD s/p CABG

## 2023-08-01 NOTE — BRIEF OPERATIVE NOTE - DISPOSITION
Take antibiotics as prescribed. Complete the entire course. If you do not complete the entire course this may result in bacterial resistance making future infections very difficult or impossible to treat. You should never have leftover antibiotics unless your antibiotic is switched. Note that if you are taking birth control medications, antibiotics can decrease their effectiveness. Recommend abstinence or back up method while on antibiotics and for 3-5 days after completing the antibiotic course. We send all positive urine for culture, we will call you if your antibiotic needs to be changed based on culture results. If symptoms do not improve in 2-3 days, follow-up with your primary care provider. If you develop fever, chills, or worsening low back pain report to the emergency room.  These are symptoms of a more serious condition or possible spread of the infection into your bloodstream.
CTU

## 2023-08-08 NOTE — H&P PST ADULT - PSYCHIATRIC
Pt notified   negative No Affect and characteristics of appearance, verbalizations, behaviors are appropriate